# Patient Record
Sex: FEMALE | Race: WHITE | NOT HISPANIC OR LATINO | Employment: FULL TIME | URBAN - METROPOLITAN AREA
[De-identification: names, ages, dates, MRNs, and addresses within clinical notes are randomized per-mention and may not be internally consistent; named-entity substitution may affect disease eponyms.]

---

## 2018-04-13 ENCOUNTER — OFFICE VISIT (OUTPATIENT)
Dept: MEDICAL GROUP | Age: 23
End: 2018-04-13
Payer: COMMERCIAL

## 2018-04-13 VITALS
HEART RATE: 89 BPM | HEIGHT: 67 IN | OXYGEN SATURATION: 98 % | SYSTOLIC BLOOD PRESSURE: 102 MMHG | BODY MASS INDEX: 22.6 KG/M2 | WEIGHT: 144 LBS | DIASTOLIC BLOOD PRESSURE: 58 MMHG | TEMPERATURE: 99 F

## 2018-04-13 DIAGNOSIS — Z00.00 PREVENTATIVE HEALTH CARE: ICD-10-CM

## 2018-04-13 DIAGNOSIS — Z3A.01 LESS THAN 8 WEEKS GESTATION OF PREGNANCY: ICD-10-CM

## 2018-04-13 PROCEDURE — 99385 PREV VISIT NEW AGE 18-39: CPT | Performed by: FAMILY MEDICINE

## 2018-04-13 ASSESSMENT — PATIENT HEALTH QUESTIONNAIRE - PHQ9: CLINICAL INTERPRETATION OF PHQ2 SCORE: 0

## 2018-04-13 NOTE — ASSESSMENT & PLAN NOTE
The patient is a 23-year-old female who presents to clinic to establish care. She has a significant history of being pregnant missing her last menstrual period 4 wks ago.   The patient denied any chest pain, no sob, no deng, no  pnd, no orthopnea, no headache, no changes in vision, no numbness or tingling, no nausea, no diarrhea, no abdominal pain, no fevers, no chills, no bright red blood per rectum, no  difficulty urinating, no burning during micturition, no depressed mood, no other concerns.      realsltate agent x 5 years    Adopted so does not know genes

## 2018-04-13 NOTE — ASSESSMENT & PLAN NOTE
The patient is a 23-year-old  at 40 weeks EGA by last menstrual period. She states that she does not plan on keeping the baby and has ordered plan ''C'' pills which are coming in the mail.  She is not  feeling contractions.  She denies any LOF or VB.  She denies any RUQ pain, change in vision, or unusual HA. She denied any gush of fluid from vagina, no bleeding, no abdominal pain, no headaches, no numbness or tingling anywhere.

## 2018-04-13 NOTE — PROGRESS NOTES
This medical record contains text that has been entered with the assistance of computer voice recognition and dictation software.  Therefore, it may contain unintended errors in text, spelling, punctuation, or grammar    Chief Complaint   Patient presents with   • [facility] Care       Sunitha Henry is a 23 y.o. female here evaluation and management of: [facility], pregnancy      HPI:     Preventative health care  The patient is a 23-year-old female who presents to clinic to [facility]. She has a significant history of being pregnant missing her last menstrual period 4 wks ago.   The patient denied any chest pain, no sob, no deng, no  pnd, no orthopnea, no headache, no changes in vision, no numbness or tingling, no nausea, no diarrhea, no abdominal pain, no fevers, no chills, no bright red blood per rectum, no  difficulty urinating, no burning during micturition, no depressed mood, no other concerns.      realsltate agent x 5 years    Adopted so does not know genes      Less than 8 weeks gestation of pregnancy  The patient is a 23-year-old  at 40 weeks EGA by last menstrual period. She states that she does not plan on keeping the baby and has ordered plan ''C'' pills which are coming in the mail.  She is not  feeling contractions.  She denies any LOF or VB.  She denies any RUQ pain, change in vision, or unusual HA. She denied any gush of fluid from vagina, no bleeding, no abdominal pain, no headaches, no numbness or tingling anywhere.                         Current medicines (including changes today)  No current outpatient prescriptions on file.     No current facility-administered medications for this visit.      She  has no past medical history on file.  She  has no past surgical history on file.  Social History   Substance Use Topics   • Smoking status: Never Smoker   • Smokeless tobacco: Never Used   • Alcohol use Yes      Comment: occasinally     Social History     Social History Narrative   • No  "narrative on file     No family history on file.  No family status information on file.         ROS    Please see hpi     All other systems reviewed and are negative     Objective:     Blood pressure 102/58, pulse 89, temperature 37.2 °C (99 °F), height 1.702 m (5' 7\"), weight 65.3 kg (144 lb), last menstrual period 02/26/2018, SpO2 98 %, not currently breastfeeding. Body mass index is 22.55 kg/m².  Physical Exam:    Constitutional: Alert, no distress.  Skin: Warm, dry, good turgor, no rashes in visible areas.  Eye: Equal, round and reactive, conjunctiva clear, lids normal.  ENMT: Lips without lesions, good dentition, oropharynx clear.  Neck: Trachea midline, no masses, no thyromegaly. No cervical or supraclavicular lymphadenopathy.  Respiratory: Unlabored respiratory effort, lungs clear to auscultation, no wheezes, no ronchi.  Cardiovascular: Normal S1, S2, no murmur, no edema.  Abdomen: Soft, non-tender, no masses, no hepatosplenomegaly.  Psych: Alert and oriented x3, normal affect and mood.          Assessment and Plan:   The following treatment plan was discussed      1. Preventative health care  Care has been established  We need baseline labs to establish a clinical profile  We reviewed USPSTF guidelines    Requested Medical records to be sent to us  Denies intimate partner viloence      2. Less than 8 weeks gestation of pregnancy  She can discuss in further detail with OB/GYN    Safe foods in pregnancy were discussed.  She was counseled on avoiding sushi, large fish because of the higher mercury content, as well as unpasteurized foods. Tuna is safe to eat if only 1 times per wk. All Deli meats should be microwaved first.   Also discussed avoiding food borne illnesses by proper storage of food.         - REFERRAL TO GYNECOLOGY  - PREGENPLUS  - COMP METABOLIC PANEL; Future  - LIPID PROFILE; Future  - CBC WITH DIFFERENTIAL; Future        HEALTH MAINTENANCE:    Instructed to Follow up in clinic or ER for " worsening symptoms, difficulty breathing, lack of expected recovery, or should new symptoms or problems arise.    Followup: Return in about 2 months (around 6/13/2018) for Reevaluation.       Once again this medical record contains text that has been entered with the assistance of computer voice recognition and dictation software.  Therefore, it may contain unintended errors in text, spelling, punctuation, or grammar

## 2018-05-07 ENCOUNTER — HOSPITAL ENCOUNTER (OUTPATIENT)
Dept: LAB | Facility: MEDICAL CENTER | Age: 23
End: 2018-05-07
Attending: OBSTETRICS & GYNECOLOGY
Payer: COMMERCIAL

## 2018-05-07 LAB
ABO GROUP BLD: NORMAL
APPEARANCE UR: ABNORMAL
BACTERIA #/AREA URNS HPF: ABNORMAL /HPF
BASOPHILS # BLD AUTO: 0.3 % (ref 0–1.8)
BASOPHILS # BLD: 0.03 K/UL (ref 0–0.12)
BILIRUB UR QL STRIP.AUTO: NEGATIVE
BLD GP AB SCN SERPL QL: NORMAL
CAOX CRY #/AREA URNS HPF: ABNORMAL /HPF
COLOR UR: YELLOW
EOSINOPHIL # BLD AUTO: 0.06 K/UL (ref 0–0.51)
EOSINOPHIL NFR BLD: 0.5 % (ref 0–6.9)
EPI CELLS #/AREA URNS HPF: ABNORMAL /HPF
ERYTHROCYTE [DISTWIDTH] IN BLOOD BY AUTOMATED COUNT: 39.5 FL (ref 35.9–50)
GLUCOSE UR STRIP.AUTO-MCNC: NEGATIVE MG/DL
HBV SURFACE AG SER QL: NEGATIVE
HCT VFR BLD AUTO: 36.8 % (ref 37–47)
HCV AB SER QL: NEGATIVE
HGB BLD-MCNC: 12.8 G/DL (ref 12–16)
HIV 1+2 AB+HIV1 P24 AG SERPL QL IA: NON REACTIVE
HYALINE CASTS #/AREA URNS LPF: ABNORMAL /LPF
IMM GRANULOCYTES # BLD AUTO: 0.03 K/UL (ref 0–0.11)
IMM GRANULOCYTES NFR BLD AUTO: 0.3 % (ref 0–0.9)
KETONES UR STRIP.AUTO-MCNC: NEGATIVE MG/DL
LEUKOCYTE ESTERASE UR QL STRIP.AUTO: ABNORMAL
LYMPHOCYTES # BLD AUTO: 2.09 K/UL (ref 1–4.8)
LYMPHOCYTES NFR BLD: 18.9 % (ref 22–41)
MCH RBC QN AUTO: 31 PG (ref 27–33)
MCHC RBC AUTO-ENTMCNC: 34.8 G/DL (ref 33.6–35)
MCV RBC AUTO: 89.1 FL (ref 81.4–97.8)
MICRO URNS: ABNORMAL
MONOCYTES # BLD AUTO: 0.51 K/UL (ref 0–0.85)
MONOCYTES NFR BLD AUTO: 4.6 % (ref 0–13.4)
NEUTROPHILS # BLD AUTO: 8.34 K/UL (ref 2–7.15)
NEUTROPHILS NFR BLD: 75.4 % (ref 44–72)
NITRITE UR QL STRIP.AUTO: NEGATIVE
NRBC # BLD AUTO: 0 K/UL
NRBC BLD-RTO: 0 /100 WBC
PH UR STRIP.AUTO: 5 [PH]
PLATELET # BLD AUTO: 275 K/UL (ref 164–446)
PMV BLD AUTO: 11.1 FL (ref 9–12.9)
PROT UR QL STRIP: NEGATIVE MG/DL
RBC # BLD AUTO: 4.13 M/UL (ref 4.2–5.4)
RBC # URNS HPF: ABNORMAL /HPF
RBC UR QL AUTO: NEGATIVE
RH BLD: NORMAL
RUBV AB SER QL: 10 IU/ML
SP GR UR STRIP.AUTO: 1.03
TREPONEMA PALLIDUM IGG+IGM AB [PRESENCE] IN SERUM OR PLASMA BY IMMUNOASSAY: NON REACTIVE
UROBILINOGEN UR STRIP.AUTO-MCNC: 0.2 MG/DL
WBC # BLD AUTO: 11.1 K/UL (ref 4.8–10.8)
WBC #/AREA URNS HPF: ABNORMAL /HPF

## 2018-05-07 PROCEDURE — 87389 HIV-1 AG W/HIV-1&-2 AB AG IA: CPT

## 2018-05-07 PROCEDURE — 86901 BLOOD TYPING SEROLOGIC RH(D): CPT

## 2018-05-07 PROCEDURE — 88175 CYTOPATH C/V AUTO FLUID REDO: CPT

## 2018-05-07 PROCEDURE — 86900 BLOOD TYPING SEROLOGIC ABO: CPT

## 2018-05-07 PROCEDURE — 87491 CHLMYD TRACH DNA AMP PROBE: CPT

## 2018-05-07 PROCEDURE — 36415 COLL VENOUS BLD VENIPUNCTURE: CPT

## 2018-05-07 PROCEDURE — 86762 RUBELLA ANTIBODY: CPT

## 2018-05-07 PROCEDURE — 86850 RBC ANTIBODY SCREEN: CPT

## 2018-05-07 PROCEDURE — 87340 HEPATITIS B SURFACE AG IA: CPT

## 2018-05-07 PROCEDURE — 85025 COMPLETE CBC W/AUTO DIFF WBC: CPT

## 2018-05-07 PROCEDURE — 81220 CFTR GENE COM VARIANTS: CPT

## 2018-05-07 PROCEDURE — 86780 TREPONEMA PALLIDUM: CPT

## 2018-05-07 PROCEDURE — 86803 HEPATITIS C AB TEST: CPT

## 2018-05-07 PROCEDURE — 87591 N.GONORRHOEAE DNA AMP PROB: CPT

## 2018-05-07 PROCEDURE — 81001 URINALYSIS AUTO W/SCOPE: CPT

## 2018-05-10 LAB
C TRACH DNA GENITAL QL NAA+PROBE: NEGATIVE
CYTOLOGY REG CYTOL: NORMAL
N GONORRHOEA DNA GENITAL QL NAA+PROBE: NEGATIVE
SPECIMEN SOURCE: NORMAL

## 2018-05-14 LAB
CF EXPANDED VARIANT PANEL INTERP Q4864: NORMAL
CFTR ALLELE 1 BLD/T QL: NEGATIVE
CFTR ALLELE 1 BLD/T QL: NEGATIVE
CFTR MUT ANL BLD/T: NORMAL

## 2018-05-17 LAB — RUBV IGM SER IA-ACNC: NORMAL

## 2018-07-03 ENCOUNTER — HOSPITAL ENCOUNTER (OUTPATIENT)
Dept: LAB | Facility: MEDICAL CENTER | Age: 23
End: 2018-07-03
Attending: OBSTETRICS & GYNECOLOGY
Payer: COMMERCIAL

## 2018-07-03 PROCEDURE — 81511 FTL CGEN ABNOR FOUR ANAL: CPT

## 2018-07-03 PROCEDURE — 36415 COLL VENOUS BLD VENIPUNCTURE: CPT

## 2018-07-06 LAB
# FETUSES US: NORMAL
AFP MOM SERPL: 0.63
AFP SERPL-MCNC: 31 NG/ML
AGE - REPORTED: 22.7 YR
CURRENT SMOKER: NO
FAMILY MEMBER DISEASES HX: NO
GA METHOD: NORMAL
GA: NORMAL WK
HCG MOM SERPL: 1.42
HCG SERPL-ACNC: NORMAL IU/L
HX OF HEREDITARY DISORDERS: NO
IDDM PATIENT QL: NO
INHIBIN A MOM SERPL: 1.13
INHIBIN A SERPL-MCNC: 185 PG/ML
INTEGRATED SCN PATIENT-IMP: NORMAL
PATHOLOGY STUDY: NORMAL
SPECIMEN DRAWN SERPL: NORMAL
U ESTRIOL MOM SERPL: 0.89
U ESTRIOL SERPL-MCNC: 1.61 NG/ML

## 2018-09-10 ENCOUNTER — HOSPITAL ENCOUNTER (OUTPATIENT)
Dept: LAB | Facility: MEDICAL CENTER | Age: 23
End: 2018-09-10
Attending: OBSTETRICS & GYNECOLOGY
Payer: COMMERCIAL

## 2018-09-10 LAB
BASOPHILS # BLD AUTO: 0.4 % (ref 0–1.8)
BASOPHILS # BLD: 0.03 K/UL (ref 0–0.12)
EOSINOPHIL # BLD AUTO: 0.06 K/UL (ref 0–0.51)
EOSINOPHIL NFR BLD: 0.7 % (ref 0–6.9)
ERYTHROCYTE [DISTWIDTH] IN BLOOD BY AUTOMATED COUNT: 42.8 FL (ref 35.9–50)
GLUCOSE 1H P 50 G GLC PO SERPL-MCNC: 134 MG/DL (ref 70–139)
HCT VFR BLD AUTO: 35.8 % (ref 37–47)
HGB BLD-MCNC: 12 G/DL (ref 12–16)
IMM GRANULOCYTES # BLD AUTO: 0.14 K/UL (ref 0–0.11)
IMM GRANULOCYTES NFR BLD AUTO: 1.7 % (ref 0–0.9)
LYMPHOCYTES # BLD AUTO: 1.56 K/UL (ref 1–4.8)
LYMPHOCYTES NFR BLD: 18.6 % (ref 22–41)
MCH RBC QN AUTO: 31.7 PG (ref 27–33)
MCHC RBC AUTO-ENTMCNC: 33.5 G/DL (ref 33.6–35)
MCV RBC AUTO: 94.5 FL (ref 81.4–97.8)
MONOCYTES # BLD AUTO: 0.45 K/UL (ref 0–0.85)
MONOCYTES NFR BLD AUTO: 5.4 % (ref 0–13.4)
NEUTROPHILS # BLD AUTO: 6.13 K/UL (ref 2–7.15)
NEUTROPHILS NFR BLD: 73.2 % (ref 44–72)
NRBC # BLD AUTO: 0 K/UL
NRBC BLD-RTO: 0 /100 WBC
PLATELET # BLD AUTO: 218 K/UL (ref 164–446)
PMV BLD AUTO: 10.6 FL (ref 9–12.9)
RBC # BLD AUTO: 3.79 M/UL (ref 4.2–5.4)
WBC # BLD AUTO: 8.4 K/UL (ref 4.8–10.8)

## 2018-09-10 PROCEDURE — 82950 GLUCOSE TEST: CPT

## 2018-09-10 PROCEDURE — 36415 COLL VENOUS BLD VENIPUNCTURE: CPT

## 2018-09-10 PROCEDURE — 85025 COMPLETE CBC W/AUTO DIFF WBC: CPT

## 2018-10-28 ENCOUNTER — APPOINTMENT (OUTPATIENT)
Dept: OTHER | Facility: IMAGING CENTER | Age: 23
End: 2018-10-28

## 2018-11-07 LAB — STREP GP B DNA PCR: NEGATIVE

## 2018-11-29 ENCOUNTER — HOSPITAL ENCOUNTER (INPATIENT)
Facility: MEDICAL CENTER | Age: 23
LOS: 2 days | End: 2018-12-01
Attending: OBSTETRICS & GYNECOLOGY | Admitting: OBSTETRICS & GYNECOLOGY
Payer: COMMERCIAL

## 2018-11-29 DIAGNOSIS — R10.2 POSTPARTUM PERINEAL PAIN: Primary | ICD-10-CM

## 2018-11-29 LAB
BASOPHILS # BLD AUTO: 0.2 % (ref 0–1.8)
BASOPHILS # BLD: 0.03 K/UL (ref 0–0.12)
EOSINOPHIL # BLD AUTO: 0.03 K/UL (ref 0–0.51)
EOSINOPHIL NFR BLD: 0.2 % (ref 0–6.9)
ERYTHROCYTE [DISTWIDTH] IN BLOOD BY AUTOMATED COUNT: 43.5 FL (ref 35.9–50)
HCT VFR BLD AUTO: 38.8 % (ref 37–47)
HGB BLD-MCNC: 12.7 G/DL (ref 12–16)
HOLDING TUBE BB 8507: NORMAL
IMM GRANULOCYTES # BLD AUTO: 0.09 K/UL (ref 0–0.11)
IMM GRANULOCYTES NFR BLD AUTO: 0.7 % (ref 0–0.9)
LYMPHOCYTES # BLD AUTO: 2.15 K/UL (ref 1–4.8)
LYMPHOCYTES NFR BLD: 16.6 % (ref 22–41)
MCH RBC QN AUTO: 29.4 PG (ref 27–33)
MCHC RBC AUTO-ENTMCNC: 32.7 G/DL (ref 33.6–35)
MCV RBC AUTO: 89.8 FL (ref 81.4–97.8)
MONOCYTES # BLD AUTO: 0.71 K/UL (ref 0–0.85)
MONOCYTES NFR BLD AUTO: 5.5 % (ref 0–13.4)
NEUTROPHILS # BLD AUTO: 9.92 K/UL (ref 2–7.15)
NEUTROPHILS NFR BLD: 76.8 % (ref 44–72)
NRBC # BLD AUTO: 0 K/UL
NRBC BLD-RTO: 0 /100 WBC
PLATELET # BLD AUTO: 206 K/UL (ref 164–446)
PMV BLD AUTO: 11.8 FL (ref 9–12.9)
RBC # BLD AUTO: 4.32 M/UL (ref 4.2–5.4)
WBC # BLD AUTO: 12.9 K/UL (ref 4.8–10.8)

## 2018-11-29 PROCEDURE — 304965 HCHG RECOVERY SERVICES

## 2018-11-29 PROCEDURE — 770002 HCHG ROOM/CARE - OB PRIVATE (112)

## 2018-11-29 PROCEDURE — 700102 HCHG RX REV CODE 250 W/ 637 OVERRIDE(OP)

## 2018-11-29 PROCEDURE — A9270 NON-COVERED ITEM OR SERVICE: HCPCS | Performed by: OBSTETRICS & GYNECOLOGY

## 2018-11-29 PROCEDURE — 59409 OBSTETRICAL CARE: CPT

## 2018-11-29 PROCEDURE — 700105 HCHG RX REV CODE 258: Performed by: OBSTETRICS & GYNECOLOGY

## 2018-11-29 PROCEDURE — 0KQM0ZZ REPAIR PERINEUM MUSCLE, OPEN APPROACH: ICD-10-PCS | Performed by: OBSTETRICS & GYNECOLOGY

## 2018-11-29 PROCEDURE — 85025 COMPLETE CBC W/AUTO DIFF WBC: CPT

## 2018-11-29 PROCEDURE — 700111 HCHG RX REV CODE 636 W/ 250 OVERRIDE (IP): Performed by: OBSTETRICS & GYNECOLOGY

## 2018-11-29 PROCEDURE — 700101 HCHG RX REV CODE 250

## 2018-11-29 PROCEDURE — A9270 NON-COVERED ITEM OR SERVICE: HCPCS

## 2018-11-29 PROCEDURE — 36415 COLL VENOUS BLD VENIPUNCTURE: CPT

## 2018-11-29 PROCEDURE — 700102 HCHG RX REV CODE 250 W/ 637 OVERRIDE(OP): Performed by: OBSTETRICS & GYNECOLOGY

## 2018-11-29 RX ORDER — ONDANSETRON 2 MG/ML
4 INJECTION INTRAMUSCULAR; INTRAVENOUS EVERY 6 HOURS PRN
Status: DISCONTINUED | OUTPATIENT
Start: 2018-11-29 | End: 2018-12-01 | Stop reason: HOSPADM

## 2018-11-29 RX ORDER — LIDOCAINE HYDROCHLORIDE 10 MG/ML
INJECTION, SOLUTION INFILTRATION; PERINEURAL
Status: COMPLETED
Start: 2018-11-29 | End: 2018-11-29

## 2018-11-29 RX ORDER — MISOPROSTOL 200 UG/1
TABLET ORAL
Status: COMPLETED
Start: 2018-11-29 | End: 2018-11-29

## 2018-11-29 RX ORDER — METHYLERGONOVINE MALEATE 0.2 MG/ML
0.2 INJECTION INTRAVENOUS
Status: DISCONTINUED | OUTPATIENT
Start: 2018-11-29 | End: 2018-11-29 | Stop reason: HOSPADM

## 2018-11-29 RX ORDER — OXYCODONE HYDROCHLORIDE AND ACETAMINOPHEN 5; 325 MG/1; MG/1
1 TABLET ORAL EVERY 4 HOURS PRN
Status: DISCONTINUED | OUTPATIENT
Start: 2018-11-29 | End: 2018-12-01 | Stop reason: HOSPADM

## 2018-11-29 RX ORDER — MISOPROSTOL 200 UG/1
1000 TABLET ORAL
Status: COMPLETED | OUTPATIENT
Start: 2018-11-29 | End: 2018-11-29

## 2018-11-29 RX ORDER — ONDANSETRON 4 MG/1
4 TABLET, ORALLY DISINTEGRATING ORAL EVERY 6 HOURS PRN
Status: DISCONTINUED | OUTPATIENT
Start: 2018-11-29 | End: 2018-12-01 | Stop reason: HOSPADM

## 2018-11-29 RX ORDER — SODIUM CHLORIDE, SODIUM LACTATE, POTASSIUM CHLORIDE, CALCIUM CHLORIDE 600; 310; 30; 20 MG/100ML; MG/100ML; MG/100ML; MG/100ML
INJECTION, SOLUTION INTRAVENOUS CONTINUOUS
Status: DISPENSED | OUTPATIENT
Start: 2018-11-29 | End: 2018-11-29

## 2018-11-29 RX ORDER — CARBOPROST TROMETHAMINE 250 UG/ML
250 INJECTION, SOLUTION INTRAMUSCULAR
Status: DISCONTINUED | OUTPATIENT
Start: 2018-11-29 | End: 2018-11-29 | Stop reason: HOSPADM

## 2018-11-29 RX ORDER — ROPIVACAINE HYDROCHLORIDE 2 MG/ML
INJECTION, SOLUTION EPIDURAL; INFILTRATION; PERINEURAL
Status: COMPLETED
Start: 2018-11-29 | End: 2018-11-29

## 2018-11-29 RX ORDER — BUPIVACAINE HYDROCHLORIDE 2.5 MG/ML
INJECTION, SOLUTION EPIDURAL; INFILTRATION; INTRACAUDAL
Status: COMPLETED
Start: 2018-11-29 | End: 2018-11-29

## 2018-11-29 RX ORDER — IBUPROFEN 600 MG/1
600 TABLET ORAL EVERY 6 HOURS PRN
Status: DISCONTINUED | OUTPATIENT
Start: 2018-11-29 | End: 2018-12-01 | Stop reason: HOSPADM

## 2018-11-29 RX ADMIN — Medication 2 MILLI-UNITS/MIN: at 16:12

## 2018-11-29 RX ADMIN — LIDOCAINE HYDROCHLORIDE: 10 INJECTION, SOLUTION INFILTRATION; PERINEURAL at 21:34

## 2018-11-29 RX ADMIN — Medication 125 ML/HR: at 22:33

## 2018-11-29 RX ADMIN — SODIUM CHLORIDE, POTASSIUM CHLORIDE, SODIUM LACTATE AND CALCIUM CHLORIDE: 600; 310; 30; 20 INJECTION, SOLUTION INTRAVENOUS at 13:00

## 2018-11-29 RX ADMIN — MISOPROSTOL 800 MCG: 200 TABLET ORAL at 21:35

## 2018-11-29 RX ADMIN — Medication 2000 ML/HR: at 21:00

## 2018-11-29 RX ADMIN — OXYCODONE AND ACETAMINOPHEN 1 TABLET: 5; 325 TABLET ORAL at 22:32

## 2018-11-29 ASSESSMENT — PAIN SCALES - GENERAL
PAINLEVEL_OUTOF10: 2
PAINLEVEL_OUTOF10: 5

## 2018-11-29 ASSESSMENT — LIFESTYLE VARIABLES: EVER_SMOKED: NEVER

## 2018-11-29 NOTE — PROGRESS NOTES
, EDC: , 40.2 weeks  Pt presents to L&D with c/o SROM at 0940 this morning. Pt unsure if she is brian. +FM. EFM/Ocean placed. VSS. Sterile speculum placed. Moderate, clear pooling present. SVE, 3-80/-2.  1210: Dr. Quinteros paged. Orders received for admission.  1315: Dr. Quinteros at bedside.  1550: SVE by RN, 80/-2. Pt coping well. Pt educated on position changes, emptying her bladder often, and nipple stimulation to promote contractions.  1715: SVE by RN, 5-80/-2.   1740: Dr. Quinteros at bedside.  1900: Report given to BON Booth RN. POC discussed.

## 2018-11-29 NOTE — PROGRESS NOTES
"Labor Progress Note    Sunitha Henry   40w2d      Subjective:  Pt with SROM, clear fluid this am at 9: 40 am. Pt is feeling contractions but not painful  Uterine contractions:yes  Pain: Yes. Severity: mild    Objective:   Vitals:    11/29/18 1133 11/29/18 1152   BP:  121/78   Pulse:  93   Temp:  36.8 °C (98.2 °F)   TempSrc:  Temporal   Weight: 83 kg (183 lb)    Height: 1.702 m (5' 7\")      Fetal heart variability:140's category 1 no decels  [place]: q 2-3  SVE: 3-4/80/-2 per nurse, gross srom      Membranes ruptured: .yes, 9:40 am    Meds:   Epidural : .no  Pitocin: .no    Labs:  No results found for this or any previous visit (from the past 24 hour(s)).    Assessment:   40w2d/AROM- pt has been admitted. Pt wants to wait on pitocin but will accept it if there is no cervical change in 2 hrs.   GBBS: negative  Fetal status: reassuring        Courtney Quinteros          "

## 2018-11-30 LAB
ERYTHROCYTE [DISTWIDTH] IN BLOOD BY AUTOMATED COUNT: 42.7 FL (ref 35.9–50)
HCT VFR BLD AUTO: 29 % (ref 37–47)
HGB BLD-MCNC: 9.7 G/DL (ref 12–16)
MCH RBC QN AUTO: 29.9 PG (ref 27–33)
MCHC RBC AUTO-ENTMCNC: 33.4 G/DL (ref 33.6–35)
MCV RBC AUTO: 89.5 FL (ref 81.4–97.8)
PLATELET # BLD AUTO: 185 K/UL (ref 164–446)
PMV BLD AUTO: 11.5 FL (ref 9–12.9)
RBC # BLD AUTO: 3.24 M/UL (ref 4.2–5.4)
WBC # BLD AUTO: 17.5 K/UL (ref 4.8–10.8)

## 2018-11-30 PROCEDURE — A9270 NON-COVERED ITEM OR SERVICE: HCPCS | Performed by: OBSTETRICS & GYNECOLOGY

## 2018-11-30 PROCEDURE — 59409 OBSTETRICAL CARE: CPT

## 2018-11-30 PROCEDURE — 700102 HCHG RX REV CODE 250 W/ 637 OVERRIDE(OP): Performed by: OBSTETRICS & GYNECOLOGY

## 2018-11-30 PROCEDURE — 700112 HCHG RX REV CODE 229: Performed by: OBSTETRICS & GYNECOLOGY

## 2018-11-30 PROCEDURE — 36415 COLL VENOUS BLD VENIPUNCTURE: CPT

## 2018-11-30 PROCEDURE — 304965 HCHG RECOVERY SERVICES

## 2018-11-30 PROCEDURE — 85027 COMPLETE CBC AUTOMATED: CPT

## 2018-11-30 PROCEDURE — 770002 HCHG ROOM/CARE - OB PRIVATE (112)

## 2018-11-30 RX ORDER — VITAMIN A ACETATE, BETA CAROTENE, ASCORBIC ACID, CHOLECALCIFEROL, .ALPHA.-TOCOPHEROL ACETATE, DL-, THIAMINE MONONITRATE, RIBOFLAVIN, NIACINAMIDE, PYRIDOXINE HYDROCHLORIDE, FOLIC ACID, CYANOCOBALAMIN, CALCIUM CARBONATE, FERROUS FUMARATE, ZINC OXIDE, CUPRIC OXIDE 3080; 12; 120; 400; 1; 1.84; 3; 20; 22; 920; 25; 200; 27; 10; 2 [IU]/1; UG/1; MG/1; [IU]/1; MG/1; MG/1; MG/1; MG/1; MG/1; [IU]/1; MG/1; MG/1; MG/1; MG/1; MG/1
1 TABLET, FILM COATED ORAL EVERY MORNING
Status: DISCONTINUED | OUTPATIENT
Start: 2018-11-30 | End: 2018-12-01 | Stop reason: HOSPADM

## 2018-11-30 RX ORDER — CARBOPROST TROMETHAMINE 250 UG/ML
250 INJECTION, SOLUTION INTRAMUSCULAR
Status: DISCONTINUED | OUTPATIENT
Start: 2018-11-30 | End: 2018-12-01 | Stop reason: HOSPADM

## 2018-11-30 RX ORDER — METHYLERGONOVINE MALEATE 0.2 MG/ML
0.2 INJECTION INTRAVENOUS
Status: DISCONTINUED | OUTPATIENT
Start: 2018-11-30 | End: 2018-12-01 | Stop reason: HOSPADM

## 2018-11-30 RX ORDER — MISOPROSTOL 200 UG/1
600 TABLET ORAL
Status: DISCONTINUED | OUTPATIENT
Start: 2018-11-30 | End: 2018-11-30

## 2018-11-30 RX ORDER — OXYCODONE AND ACETAMINOPHEN 10; 325 MG/1; MG/1
1 TABLET ORAL EVERY 4 HOURS PRN
Status: DISCONTINUED | OUTPATIENT
Start: 2018-11-30 | End: 2018-12-01 | Stop reason: HOSPADM

## 2018-11-30 RX ORDER — SODIUM CHLORIDE, SODIUM LACTATE, POTASSIUM CHLORIDE, CALCIUM CHLORIDE 600; 310; 30; 20 MG/100ML; MG/100ML; MG/100ML; MG/100ML
INJECTION, SOLUTION INTRAVENOUS PRN
Status: DISCONTINUED | OUTPATIENT
Start: 2018-11-30 | End: 2018-12-01 | Stop reason: HOSPADM

## 2018-11-30 RX ORDER — DOCUSATE SODIUM 100 MG/1
100 CAPSULE, LIQUID FILLED ORAL 2 TIMES DAILY PRN
Status: DISCONTINUED | OUTPATIENT
Start: 2018-11-30 | End: 2018-12-01 | Stop reason: HOSPADM

## 2018-11-30 RX ADMIN — Medication 1 TABLET: at 06:50

## 2018-11-30 RX ADMIN — IBUPROFEN 600 MG: 600 TABLET, FILM COATED ORAL at 00:37

## 2018-11-30 RX ADMIN — IBUPROFEN 600 MG: 600 TABLET, FILM COATED ORAL at 19:28

## 2018-11-30 RX ADMIN — OXYCODONE AND ACETAMINOPHEN 1 TABLET: 5; 325 TABLET ORAL at 14:50

## 2018-11-30 RX ADMIN — OXYCODONE AND ACETAMINOPHEN 1 TABLET: 5; 325 TABLET ORAL at 19:29

## 2018-11-30 RX ADMIN — DOCUSATE SODIUM 100 MG: 100 CAPSULE, LIQUID FILLED ORAL at 13:40

## 2018-11-30 RX ADMIN — IBUPROFEN 600 MG: 600 TABLET, FILM COATED ORAL at 10:39

## 2018-11-30 RX ADMIN — IBUPROFEN 600 MG: 600 TABLET, FILM COATED ORAL at 04:57

## 2018-11-30 RX ADMIN — OXYCODONE AND ACETAMINOPHEN 1 TABLET: 5; 325 TABLET ORAL at 02:35

## 2018-11-30 RX ADMIN — OXYCODONE AND ACETAMINOPHEN 1 TABLET: 5; 325 TABLET ORAL at 10:39

## 2018-11-30 RX ADMIN — OXYCODONE AND ACETAMINOPHEN 1 TABLET: 5; 325 TABLET ORAL at 06:49

## 2018-11-30 ASSESSMENT — PAIN SCALES - GENERAL
PAINLEVEL_OUTOF10: 2
PAINLEVEL_OUTOF10: 7
PAINLEVEL_OUTOF10: 9
PAINLEVEL_OUTOF10: 6
PAINLEVEL_OUTOF10: 6
PAINLEVEL_OUTOF10: 3
PAINLEVEL_OUTOF10: 7
PAINLEVEL_OUTOF10: 7

## 2018-11-30 NOTE — H&P
"DATE OF ADMISSION:  2018    CHIEF COMPLAINT:  \"My water broke at 9:40 in the morning.\"    HISTORY OF PRESENT ILLNESS:  This patient is 23-year-old  1, para 0   with a due date of 2018 based on a 10-week ultrasound who has had   prenatal care with me.  Her prenatal care has been uncomplicated.  She comes   in complaining of spontaneous rupture of membranes at 9:40 in the morning,   which was confirmed.  When she arrived, she was 3-4 cm dilated.  She has   progressed on 2 milliunits of Pitocin and now she is 5-6 cm dilated with a   reassuring fetal heart tracing.  Patient declines an epidural at this time and   she is tolerating the contractions well.    PAST MEDICAL HISTORY:  None.    PAST SURGICAL HISTORY:  None.    MEDICATIONS:  Prenatal vitamins.    ALLERGIES:  No known drug allergies.    OBSTETRICAL HISTORY:  She is a  1, para 0.    GYNECOLOGIC HISTORY:  Patient started menstruating at age 15, has menstrual   cycles every 28 days, lasts about 5 days.  No history of STDs.  No history of   HSV.  Her last Pap smear was on 2018.  Negative Pap, negative chlamydia,   negative gonorrhea.    SOCIAL HISTORY:  Patient is .  Denies tobacco, alcohol or drug use.    PHYSICAL EXAMINATION:  VITAL SIGNS:  Blood pressure is 118/70, afebrile.  Total weight gain this   pregnancy is 43 pounds.  GENERAL:  Pleasant female in some distress secondary to contractions.  LUNGS:  Clear to auscultation bilaterally.  CARDIOVASCULAR:  Regular rate and rhythm, no murmur.  ABDOMEN:  Soft, gravid.  Leopold's to 7-1/2 pounds.  EXTREMITIES:  No calf tenderness.  PELVIC:  Fetal heart tones 130s, category 1.  [place] brian every 2-4   minutes.  Sterile vaginal exam, she is 5-6 cm dilated, 90% effaced, 0 station.    Per nurse with gross rupture of membranes, which was clear.  EXTREMITIES:  No calf tenderness.    LABORATORY DATA:  Patient's prenatal labs:  GBS is negative.  Patient's H and   H at 28 weeks 12.0 " and 35.8 with platelets of 218, patient's 1-hour glucose is   134.  Quadruple screen was negative, chlamydia and gonorrhea negative, Pap   smear negative, cystic fibrosis negative, hepatitis C negative, hepatitis B   surface antigen negative, rubella immune, RPR nonreactive, HIV negative, A   positive, antibody screen negative.    ASSESSMENT AND PLAN:  A 23-year-old  1, para 0 at 40 weeks and 2 days   by ultrasound at 10 weeks.  1.  Active labor/spontaneous rupture of membranes.  Patient at this time is on   2 milliunits of Pitocin and progressing.  Fetal status remains reassuring.    We will continue present management and expect a normal spontaneous vaginal   delivery.  2.  GBS is negative.  3.  Pain control.  Patient declines an epidural and is handling her   contractions without any problems.  4.  Fetal status reassuring.       ____________________________________     MD TINA JUNE / CHARU    DD:  2018 19:47:44  DT:  2018 20:28:19    D#:  [id number]  Job#:  [id number]

## 2018-11-30 NOTE — L&D DELIVERY NOTE
DATE OF SERVICE:  2018    Stage I labor:  The patient was admitted as a 23-year-old  1, para 0 at   40+2 weeks gestation with a stated BERENICE of 2018, who presents to labor   and delivery with the report of spontaneous rupture of membranes of clear   fluid at 9:40 a.m. on 2018.  At the time of admission earlier in the   late morning of 2018, the patient was found to be 3-4 cm, 80% effaced,   -2 station.  The patient initially declined IV Pitocin for labor augmentation.    She did then agree to it and was started on this at 1500 hours.  At   approximately 1758 hours, her cervix was reexamined and she was found to be   5-6 cm, 90% effacement, and 0 station.  The patient progressed to completely   dilated, 100% effaced, +2 station at 20:38.  Category 1-2 tracing during stage   I labor.  A prolonged deceleration was noted at approximately 20:35 p.m.    This was noted prior to her progressing to completely dilated.    The tracing was otherwise category 1 tracing throughout stage I labor.    Stage II labor:  Twenty minutes.  I was present for normal spontaneous vaginal   delivery of a vigorous and viable female infant at 2058 hours.  The vertex of   the infant was delivered without difficulty.  No nuchal cord was palpated and   no shoulder dystocia was encountered.  The remainder of the infant delivered   without difficulty.  The umbilical cord was noted to be loosely wrapped around   the infant's left leg.  This was then reduced.  The infant was placed on the   mother's abdomen.  The infant's mouth and nose were bulb suctioned.  Delayed   cord clamping was performed.  A segment of cord was clamped and cut for cord   gas, but this was held as the Apgars scores were 8 at 1 minute and 9 at 5   minutes.    The infant weight is pending.    The patient received IV Pitocin after delivery of the infant.    Stage III labor:  Fifteen minutes.  The placenta delivered intact with a   3-vessel cord at 2113  hours.    The patient's perineum was examined and she was found to have sustained   bilateral second-degree hymenal ring lacerations with bilateral second-degree   labial minora extensions.  These lacerations were repaired in the usual   fashion with 3-0 Vicryl after the instillation of 1% lidocaine without   epinephrine.    Bimanual uterine massage and exploration was performed and small clots were   removed from the lower uterine segment.    The patient received 800 mcg of Cytotec per rectum x1 after the above   examination.    Total  mL.    Patient is doing well after delivery of the infant and is working on   breastfeeding.       ____________________________________     JOSE HENDRICKS MD    HTA / NTS    DD:  11/29/2018 21:46:45  DT:  11/29/2018 22:14:17    D#:  [id number]  Job#:  [id number]    cc: RYNE CHANCE MD

## 2018-11-30 NOTE — LACTATION NOTE
"This note was copied from a baby's chart.  Mother states baby BF for 8-10 minutes just prior to LC arrival, baby currently sleepy and no feeding cues noted, baby was 40.2 weeks gestation at birth, birth weight 3.45 kg, currently approximately 15 hours old, discussed the normal course of BF including feeding frequency and duration, discussed normal  behaviors including sleep-wake cycle, encouraged Q 2-3 hour feeding attempts (more often if feeding cues noted), encouraged frequent [id number].    \"A New Beginning\" booklet provided    Discussed assistance available at [facility] after discharge, invited to BF [place] and encouraged to call to schedule 1:1 consult as needed    Encouraged to call for latch check/assistance as needed  "

## 2018-11-30 NOTE — PROGRESS NOTES
"IUP at 40w2d.  Active labor.  GBS negative.    S: Doing ok.  Feeling increased pressure and having the urge to push.  Asked to evaluate the patient for abnormal fetal heart tones remote from delivery.  Patient to begin maternal expulsive efforts.    O:  Blood pressure 121/64, pulse (!) 110, temperature 36.2 °C (97.2 °F), temperature source Oral, height 1.702 m (5' 7\"), weight 83 kg (183 lb), last menstrual period 2018, not currently breastfeeding.    A, A, and O x 3     SVE: complete/100%/0    TOCO: every 2-4 minutes    EFM: category II tracing.  Baseline 120 bpm with accelerations to 140 bpm.  Prolonged deceleration to 70-80 bpm for 5 minutes with return to baseline and accelerations to 170 bpm.     A/P: IUP at 40w2d.    1. Begin maternal expulsive efforts.  2. GBS negative.  3.  Anticipate .  "

## 2018-11-30 NOTE — CARE PLAN
Problem: Pain  Goal: Alleviation of Pain or a reduction in pain to the patient's comfort goal  Outcome: PROGRESSING AS EXPECTED  Pain POC discussed. Pt desires all natural childbirth and will call out if medication intervention is necessary    Problem: Risk for Infection, Impaired Wound Healing  Goal: Remain free from signs and symptoms of infection  Outcome: PROGRESSING AS EXPECTED  Pt afebrile, no s/s of infection

## 2018-11-30 NOTE — PROGRESS NOTES
-Report received from BON Daniel RN. Pt resting in bed, FOB at bedside. POC discussed. Pt denies needs.   -SVE 7/100/-1  -Dr. Wu phoned, updated on SVE, no new orders received  -Pt called out, requested epidural, IV fluid bolus started, anesthesiologist updated  -Pt called out, reported pressure and needs to push, SVE C/0.   -FHTs down, pt repositioned, oxygen on via mask, IV fluid bolus, pitocin off. FHTs back to baseline within 5 minutes. Phoned Dr. Wu, updated on labor status and requested presence, stated she is on her way  - of viable female infant, Dr. Wu at bedside, apgars 8/9  2330-Pt up to bathroom, voided, pericare provided, gown changed  -Pt transferred to  via wheelchair. Report given to Preeti SUBRAMANIAN.

## 2018-11-30 NOTE — PROGRESS NOTES
"Labor Progress Note    Sunitha Henry   40w2d      Subjective:  Pt now hurting with contractions.  Uterine contractions:yes  Pain: Yes. Severity: moderate    Objective:   Vitals:    18 1133 18 1152 18 1612 18 1617   BP:  121/78  136/63   Pulse:  93  93   Temp:  36.8 °C (98.2 °F) 36.5 °C (97.7 °F)    TempSrc:  Temporal Oral    Weight: 83 kg (183 lb)      Height: 1.702 m (5' 7\")        Fetal heart variability: 130's category 1 no decels  [place]; q 2-4  SVE: 5-6/90/0 per nurse       Membranes ruptured: .yes    Meds:   Epidural : .no  Pitocin: .yes, 2 mu    Labs:  Recent Results (from the past 24 hour(s))   Hold Blood Bank Specimen (Not Tested)    Collection Time: 18  1:00 PM   Result Value Ref Range    Holding Tube - Bb DONE    CBC WITH DIFFERENTIAL    Collection Time: 18  1:00 PM   Result Value Ref Range    WBC 12.9 (H) 4.8 - 10.8 K/uL    RBC 4.32 4.20 - 5.40 M/uL    Hemoglobin 12.7 12.0 - 16.0 g/dL    Hematocrit 38.8 37.0 - 47.0 %    MCV 89.8 81.4 - 97.8 fL    MCH 29.4 27.0 - 33.0 pg    MCHC 32.7 (L) 33.6 - 35.0 g/dL    RDW 43.5 35.9 - 50.0 fL    Platelet Count 206 164 - 446 K/uL    MPV 11.8 9.0 - 12.9 fL    Neutrophils-Polys 76.80 (H) 44.00 - 72.00 %    Lymphocytes 16.60 (L) 22.00 - 41.00 %    Monocytes 5.50 0.00 - 13.40 %    Eosinophils 0.20 0.00 - 6.90 %    Basophils 0.20 0.00 - 1.80 %    Immature Granulocytes 0.70 0.00 - 0.90 %    Nucleated RBC 0.00 /100 WBC    Neutrophils (Absolute) 9.92 (H) 2.00 - 7.15 K/uL    Lymphs (Absolute) 2.15 1.00 - 4.80 K/uL    Monos (Absolute) 0.71 0.00 - 0.85 K/uL    Eos (Absolute) 0.03 0.00 - 0.51 K/uL    Baso (Absolute) 0.03 0.00 - 0.12 K/uL    Immature Granulocytes (abs) 0.09 0.00 - 0.11 K/uL    NRBC (Absolute) 0.00 K/uL       Assessment:   40w2d/active labor/srom- pt progressing on 2 mu of pitocin. CPM, expt   GBBS: negative  Fetal status: reassuring.        Courtney Quinteros          "

## 2018-11-30 NOTE — L&D DELIVERY NOTE
LABOR AND DELIVERY    DELIVERY SUMMARY    STAGE I LABOR:    The patient is a 24 yo  at 40w2d with an BERENICE of 18 who presents to labor and delivery with the report of SROM clear fluid at 0940 on 18.  She was found to be 3-4/80%/-2 at the time of admission.  She was started on IV pitocin for labor augmentation at 1500 and at 1758 her cervix was 5-6/90%/0.  She progressed to complete/100%/+2 at .  Category I-II tracing during stage I labor.  A prolonged deceleration from the baseline of 120 bpm to 70 bpm was noted prior to her progression to completely dilated.      STAGE II LABOR:    I was present for a  of a viable female infant at .  The vertex of the infant delivered without difficulty.  No nuchal cord was palpated and no shoulder dystocia was encountered.   The remainder of the infant delivered without difficulty.  The umbilical cord was noted to be loosely wrapped around the left leg.  The infant was placed on the mother's abdomen.  The infant's mouth and nose were bulb suctioned.  Delayed cord clamping was performed.   The cord was clamped and cut.  A segment of cord was clamped and cut for a cord gas but this was held as the APGAR scores were 8 at one minute and 9 at five minutes.    The infant weight is pending.      The patient received IV pitocin after delivery of the infant.    STAGE III LABOR:    The placenta delivered intact with a 3-vessel cord at .    The patient's perineum was examined and found to have sustained bilateral 2nd degree hymenal ring lacerations with 2nd degree bilateral labia minora extensions.  These lacerations were repaired in the usual fashion with 3-0 vicryl after the instillation of 1% lidocaine without epinephrine.      Bimanual uterine massage and exploration was performed and small clots were removed from the lower uterine segment.     800 mcg of cytotec per rectum x 1 was placed after the above examination.    EBL - 300 cc

## 2018-11-30 NOTE — PROGRESS NOTES
Patient assessment done.  Patient oriented to room and call light.  Condition will continue to be monitored.

## 2018-11-30 NOTE — PROGRESS NOTES
Progress Note    Sunitha Henry PP day 1  Chief Admitting Dx: pregnancy  Indication for care in labor or delivery  Delivery Type: vaginal, spontaneous      Subjective:  Pt working on breastfeeding and wants to stay another day.  Ambulating: yes  Tolerating oral feed: yes  Flatus: yes    Voiding: yes  Dizziness: no  Vitals:    11/29/18 2321 11/29/18 2330 11/30/18 0036 11/30/18 0400   BP: 116/71 116/71 111/79 (!) 96/53   Pulse: (!) 101 (!) 101 100 77   Resp:   16 17   Temp:   37.6 °C (99.6 °F) 36.7 °C (98 °F)   TempSrc:   Temporal Temporal   SpO2:   96% 97%   Weight:       Height:           Exam:  Gen: NAD  Abdomen: Abdomen soft, non-tender. BS normal. No masses,  No organomegaly  Fundus Tenderness: no  Below umbilicus: yes  Extremities: 1+ edema  Lochia: mild    Labs:   Recent Results (from the past 24 hour(s))   Hold Blood Bank Specimen (Not Tested)    Collection Time: 11/29/18  1:00 PM   Result Value Ref Range    Holding Tube - Bb DONE    CBC WITH DIFFERENTIAL    Collection Time: 11/29/18  1:00 PM   Result Value Ref Range    WBC 12.9 (H) 4.8 - 10.8 K/uL    RBC 4.32 4.20 - 5.40 M/uL    Hemoglobin 12.7 12.0 - 16.0 g/dL    Hematocrit 38.8 37.0 - 47.0 %    MCV 89.8 81.4 - 97.8 fL    MCH 29.4 27.0 - 33.0 pg    MCHC 32.7 (L) 33.6 - 35.0 g/dL    RDW 43.5 35.9 - 50.0 fL    Platelet Count 206 164 - 446 K/uL    MPV 11.8 9.0 - 12.9 fL    Neutrophils-Polys 76.80 (H) 44.00 - 72.00 %    Lymphocytes 16.60 (L) 22.00 - 41.00 %    Monocytes 5.50 0.00 - 13.40 %    Eosinophils 0.20 0.00 - 6.90 %    Basophils 0.20 0.00 - 1.80 %    Immature Granulocytes 0.70 0.00 - 0.90 %    Nucleated RBC 0.00 /100 WBC    Neutrophils (Absolute) 9.92 (H) 2.00 - 7.15 K/uL    Lymphs (Absolute) 2.15 1.00 - 4.80 K/uL    Monos (Absolute) 0.71 0.00 - 0.85 K/uL    Eos (Absolute) 0.03 0.00 - 0.51 K/uL    Baso (Absolute) 0.03 0.00 - 0.12 K/uL    Immature Granulocytes (abs) 0.09 0.00 - 0.11 K/uL    NRBC (Absolute) 0.00 K/uL       Assessment:  Continue Routine  postpartum care      Plan:  Consider Discharge 24h-48 hours    Courtney Quinteros M.D.

## 2018-12-01 VITALS
BODY MASS INDEX: 28.72 KG/M2 | HEART RATE: 74 BPM | OXYGEN SATURATION: 95 % | HEIGHT: 67 IN | DIASTOLIC BLOOD PRESSURE: 60 MMHG | SYSTOLIC BLOOD PRESSURE: 92 MMHG | WEIGHT: 183 LBS | RESPIRATION RATE: 18 BRPM | TEMPERATURE: 98.5 F

## 2018-12-01 PROCEDURE — 700102 HCHG RX REV CODE 250 W/ 637 OVERRIDE(OP): Performed by: OBSTETRICS & GYNECOLOGY

## 2018-12-01 PROCEDURE — A9270 NON-COVERED ITEM OR SERVICE: HCPCS | Performed by: OBSTETRICS & GYNECOLOGY

## 2018-12-01 PROCEDURE — 700112 HCHG RX REV CODE 229: Performed by: OBSTETRICS & GYNECOLOGY

## 2018-12-01 RX ORDER — FERROUS SULFATE 325(65) MG
325 TABLET ORAL
Qty: 20 TAB | Refills: 0 | Status: ON HOLD | OUTPATIENT
Start: 2018-12-01 | End: 2021-06-25

## 2018-12-01 RX ORDER — PSEUDOEPHEDRINE HCL 30 MG
100 TABLET ORAL 2 TIMES DAILY PRN
Qty: 60 CAP | Refills: 1 | Status: SHIPPED | OUTPATIENT
Start: 2018-12-01 | End: 2022-07-07

## 2018-12-01 RX ORDER — OXYCODONE AND ACETAMINOPHEN 10; 325 MG/1; MG/1
.5-1 TABLET ORAL EVERY 6 HOURS PRN
Qty: 15 TAB | Refills: 0 | Status: SHIPPED | OUTPATIENT
Start: 2018-12-01 | End: 2018-12-05

## 2018-12-01 RX ORDER — FERROUS SULFATE 325(65) MG
325 TABLET ORAL
Status: DISCONTINUED | OUTPATIENT
Start: 2018-12-01 | End: 2018-12-01 | Stop reason: HOSPADM

## 2018-12-01 RX ORDER — IBUPROFEN 600 MG/1
600 TABLET ORAL EVERY 6 HOURS PRN
Qty: 20 TAB | Refills: 1 | Status: ON HOLD | OUTPATIENT
Start: 2018-12-01 | End: 2021-06-25

## 2018-12-01 RX ADMIN — Medication 1 TABLET: at 08:33

## 2018-12-01 RX ADMIN — FERROUS SULFATE TAB 325 MG (65 MG ELEMENTAL FE) 325 MG: 325 (65 FE) TAB at 08:33

## 2018-12-01 RX ADMIN — IBUPROFEN 600 MG: 600 TABLET, FILM COATED ORAL at 06:25

## 2018-12-01 RX ADMIN — OXYCODONE HYDROCHLORIDE AND ACETAMINOPHEN 1 TABLET: 10; 325 TABLET ORAL at 06:25

## 2018-12-01 RX ADMIN — DOCUSATE SODIUM 100 MG: 100 CAPSULE, LIQUID FILLED ORAL at 08:35

## 2018-12-01 ASSESSMENT — EDINBURGH POSTNATAL DEPRESSION SCALE (EPDS)
I HAVE BEEN SO UNHAPPY THAT I HAVE BEEN CRYING: NO, NEVER
I HAVE FELT SAD OR MISERABLE: NOT VERY OFTEN
THINGS HAVE BEEN GETTING ON TOP OF ME: NO, I HAVE BEEN COPING AS WELL AS EVER
I HAVE LOOKED FORWARD WITH ENJOYMENT TO THINGS: AS MUCH AS I EVER DID
I HAVE BEEN SO UNHAPPY THAT I HAVE HAD DIFFICULTY SLEEPING: NOT AT ALL
I HAVE FELT SCARED OR PANICKY FOR NO GOOD REASON: NO, NOT AT ALL
I HAVE BEEN ABLE TO LAUGH AND SEE THE FUNNY SIDE OF THINGS: AS MUCH AS I ALWAYS COULD
I HAVE BLAMED MYSELF UNNECESSARILY WHEN THINGS WENT WRONG: NOT VERY OFTEN
I HAVE BEEN ANXIOUS OR WORRIED FOR NO GOOD REASON: HARDLY EVER
THE THOUGHT OF HARMING MYSELF HAS OCCURRED TO ME: NEVER

## 2018-12-01 ASSESSMENT — PAIN SCALES - GENERAL
PAINLEVEL_OUTOF10: 4
PAINLEVEL_OUTOF10: 7

## 2018-12-01 NOTE — PROGRESS NOTES
Pt discharged home with infant via WC and hospital escort. Bands were checked with infant. Pt stated that all her questions had been answered.

## 2018-12-01 NOTE — PROGRESS NOTES
1900- Bedside report received, assumed care of patient.  Pt sitting up in bed with no s/s of distress, family at bedside, denies needs at this time.  1925- Pt medicated for pain.  1940- Pt breastfeeding infant independently.  2100- Pt assessment complete, wnl.  Pt ambulating and voiding without difficulty.  Bonding well with infant.  Plan of care discussed with patient for the night.  Questions answered.  FOB at bedside and supportive.  Encouraged to call with needs.

## 2018-12-01 NOTE — DISCHARGE INSTRUCTIONS
POSTPARTUM DISCHARGE INSTRUCTIONS FOR MOM    YOB: 1995   Age: 23 y.o.               Admit Date: 2018     Discharge Date: 2018  Attending Doctor:  Courtney Quinteros M.D.                  Allergies:  Patient has no known allergies.    Discharged to home by car. Discharged via wheelchair, hospital escort: Yes.  Special equipment needed: Not Applicable  Belongings with: Personal  Be sure to schedule a follow-up appointment with your primary care doctor or any specialists as instructed.     Discharge Plan:   Diet Plan: Discussed  Activity Level: Discussed  Confirmed Follow up Appointment: Patient to Call and Schedule Appointment  Confirmed Symptoms Management: Discussed  Medication Reconciliation Updated: Yes  Influenza Vaccine Indication: Patient Refuses    REASONS TO CALL YOUR OBSTETRICIAN:  1.   Persistent fever or shaking chills (Temperature higher than 100.4)  2.   Heavy bleeding (soaking more than 1 pad per hour); Passing clots  3.   Foul odor from vagina  4.   Mastitis (Breast infection; breast pain, chills, fever, redness)  5.   Urinary pain, burning or frequency  6.   Episiotomy infection  7.   Abdominal incision infection  8.   Severe depression longer than 24 hours    HAND WASHING  · Prior to handling the baby.  · Before breastfeeding or bottle feeding baby.  · After using the bathroom or changing the baby's diaper.    WOUND CARE  Ask your physician for additional care instructions.  In general:    ·  Incision:      · Keep clean and dry.    · Do NOT lift anything heavier than your baby for up to 6 weeks.    · There should not be any opening or pus.      VAGINAL CARE  · Nothing inside vagina for 6 weeks: no sexual intercourse, tampons or douching.  · Bleeding may continue for 2-4 weeks.  Amount may vary.    · Call your physician for heavy bleeding which means soaking more than 1 pad per hour    BIRTH CONTROL  · It is possible to become pregnant at any time after delivery and  "while breastfeeding.  · Plan to discuss a method of birth control with your physician at your follow up visit. visit.    DIET AND ELIMINATION  · Eating more fiber (bran cereal, fruits, and vegetables) and drinking plenty of fluids will help to avoid constipation.  · Urinary frequency after childbirth is normal.    POSTPARTUM BLUES  During the first few days after birth, you may experience a sense of the \"blues\" which may include impatience, irritability or even crying.  These feeling come and go quickly.  However, as many as 1 in 10 women experience emotional symptoms known as postpartum depression.    Postpartum depression:  May start as early as the second or third day after delivery or take several weeks or months to develop.  Symptoms of \"blues\" are present, but are more intense:  Crying spells; loss of appetite; feelings of hopelessness or loss of control; fear of touching the baby; over concern or no concern at all about the baby; little or no concern about your own appearance/caring for yourself; and/or inability to sleep or excessive sleeping.  Contact your physician if you are experiencing any of these symptoms.    Crisis Hotline:  · [place] Crisis Hotline:  2-704-VHNFYIO  Or 1-608.210.8022  · [place] Crisis Hotline:  1-883.956.4462  Or 670-273-6835    PREVENTING SHAKEN BABY:  If you are angry or stressed, PUT THE BABY IN THE CRIB, step away, take some deep breaths, and wait until you are calm to care for the baby.  DO NOT SHAKE THE BABY.  You are not alone, call a supporter for help.    · Crisis Call Center 24/7 crisis line 872-166-7823 or 1-369.192.4197  · You can also text them, text \"ANSWER\" to 046083    QUIT SMOKING/TOBACCO USE:  I understand the use of any tobacco products increases my chance of suffering from future heart disease and could cause other illnesses which may shorten my life. Quitting the use of tobacco products is the single most important thing I can do to improve my health. For further " information on smoking / tobacco cessation call a Toll Free Quit Line at 1-272.221.7147 (*[employer] [place]) or 1-618.563.3338 (American Lung Association) or you can access the web based program at www.lungusa.org.    · [place] Tobacco Users Help Line:  (245) 145-3179       Toll Free: 1-722.870.5756  · Quit Tobacco Program [facility] (321)338-3537    DEPRESSION / SUICIDE RISK:  As you are discharged from this [facility], it is important to learn how to keep safe from harming yourself.    Recognize the warning signs:  · Abrupt changes in personality, positive or negative- including increase in energy   · Giving away possessions  · Change in eating patterns- significant weight changes-  positive or negative  · Change in sleeping patterns- unable to sleep or sleeping all the time   · Unwillingness or inability to communicate  · Depression  · Unusual sadness, discouragement and loneliness  · Talk of wanting to die  · Neglect of personal appearance   · Rebelliousness- reckless behavior  · Withdrawal from people/activities they love  · Confusion- inability to concentrate     If you or a loved one observes any of these behaviors or has concerns about self-harm, here's what you can do:  · Talk about it- your feelings and reasons for harming yourself  · Remove any means that you might use to hurt yourself (examples: pills, rope, extension cords, firearm)  · Get professional help from the community (Mental Health, Substance Abuse, psychological counseling)  · Do not be alone:Call your Safe Contact- someone whom you trust who will be there for you.  · Call your local CRISIS HOTLINE 550-9787 or 445-409-5435  · Call your local Children's Mobile Crisis Response Team [place] (415) 510-7583 or www.[employer]  · Call the toll free National Suicide Prevention Hotlines   · National Suicide Prevention Lifeline 830-631-ECBO (0656)  · National Hope Line Network 800-SUICIDE  (815-4565)    DISCHARGE SURVEY:  Thank you for choosing [facility].  We hope we provided you with very good care.  You may be receiving a survey in the mail.  Please fill it out.  Your opinion is valuable to us.    ADDITIONAL EDUCATIONAL MATERIALS GIVEN TO PATIENT:        My signature on this form indicates that:  1.  I have reviewed and understand the above information  2.  My questions regarding this information have been answered to my satisfaction.  3.  I have formulated a plan with my discharge nurse to obtain my prescribed medication for home.

## 2018-12-01 NOTE — PROGRESS NOTES
POSTPARTUM DAY 1.5    No complaints.  Patient is doing well with infant care and lactation issues.  Patient is voiding well.    PE:    Afebrile  BP normal    Uterus involuting appropriately, nontender  Perineum:  No perineal complaints, so I didn't do specific perineal exam.  Calves nontender, Dony negative bilaterally.     LAB:        9/10/2018 10:11 11/29/2018 13:00 11/30/2018 07:54   WBC 8.4 12.9 (H) 17.5 (H)   Hemoglobin 12.0 12.7 9.7 (L)   Hematocrit 35.8 (L) 38.8 29.0 (L)   Platelet Count 218 206 185        PLAN: FeSO4 325 mg/day x 3 weeks.   Postpartum care.  Lactation consult.  Patient desires discharge:  today  .    Prescriptions:   PNV, FeSO4 325 mg/day x 3 weeks, percocet 10/325 #15, ibuprofen, colace .  Followup plans:   6 weeks .

## 2018-12-05 ENCOUNTER — HOSPITAL ENCOUNTER (OUTPATIENT)
Dept: LACTATION | Facility: MEDICAL CENTER | Age: 23
End: 2018-12-05

## 2018-12-06 NOTE — LACTATION NOTE
11/29/2018: 7# 9oz  12/04/2018: 7# 8oz (Ped)  12/05/2018: 7# 6.2oz (TLC) Intake: 56mls Left side only, with nipple shield    Sunitha has been feeding on one breast every 3-5 hours for up to 20 minutes, occasionally longer. She came in because latch is painful and her nipples are very sore. Jose Eduardo has some tongue restriction that causes her to bite and pinch the nipple when latching. We applied the 24mm nipple shield and Sunitha did not have any pain.     Plan: Feed with the nipple shield. Offer the breast every 2-3 hours during the day, up to 4 hours during the night. One breast is ok unless she still seems hungry after 20 minutes, then offer the second breast.     Suggested Haakaa pump to use on the side the baby is not taking. Discussed pumping around 2 weeks of age.    Follow Up: Weight check on Tuesday, 12.11.18 and will request a follow up appointment if she feels she is in need.

## 2020-10-28 ENCOUNTER — APPOINTMENT (OUTPATIENT)
Dept: LAB | Facility: MEDICAL CENTER | Age: 25
End: 2020-10-28
Attending: OBSTETRICS & GYNECOLOGY
Payer: COMMERCIAL

## 2020-10-29 ENCOUNTER — HOSPITAL ENCOUNTER (OUTPATIENT)
Dept: LAB | Facility: MEDICAL CENTER | Age: 25
End: 2020-10-29
Attending: OBSTETRICS & GYNECOLOGY
Payer: COMMERCIAL

## 2020-10-29 PROCEDURE — 84144 ASSAY OF PROGESTERONE: CPT

## 2020-10-29 PROCEDURE — 84702 CHORIONIC GONADOTROPIN TEST: CPT

## 2020-10-29 PROCEDURE — 36415 COLL VENOUS BLD VENIPUNCTURE: CPT

## 2020-10-30 LAB
B-HCG SERPL-ACNC: ABNORMAL MIU/ML (ref 0–5)
PROGEST SERPL-MCNC: 18 NG/ML

## 2020-10-31 ENCOUNTER — HOSPITAL ENCOUNTER (OUTPATIENT)
Dept: LAB | Facility: MEDICAL CENTER | Age: 25
End: 2020-10-31
Attending: OBSTETRICS & GYNECOLOGY
Payer: COMMERCIAL

## 2020-10-31 LAB — B-HCG SERPL-ACNC: ABNORMAL MIU/ML (ref 0–5)

## 2020-10-31 PROCEDURE — 84702 CHORIONIC GONADOTROPIN TEST: CPT

## 2020-10-31 PROCEDURE — 36415 COLL VENOUS BLD VENIPUNCTURE: CPT

## 2020-11-02 ENCOUNTER — HOSPITAL ENCOUNTER (OUTPATIENT)
Facility: MEDICAL CENTER | Age: 25
End: 2020-11-02
Attending: OBSTETRICS & GYNECOLOGY
Payer: COMMERCIAL

## 2020-11-02 LAB
25(OH)D3 SERPL-MCNC: 40 NG/ML (ref 30–100)
ABO GROUP BLD: NORMAL
BASOPHILS # BLD AUTO: 0.2 % (ref 0–1.8)
BASOPHILS # BLD: 0.01 K/UL (ref 0–0.12)
BLD GP AB SCN SERPL QL: NORMAL
EOSINOPHIL # BLD AUTO: 0.05 K/UL (ref 0–0.51)
EOSINOPHIL NFR BLD: 0.9 % (ref 0–6.9)
ERYTHROCYTE [DISTWIDTH] IN BLOOD BY AUTOMATED COUNT: 43.1 FL (ref 35.9–50)
HBV SURFACE AG SER QL: ABNORMAL
HCT VFR BLD AUTO: 42.4 % (ref 37–47)
HCV AB SER QL: NORMAL
HGB BLD-MCNC: 13.8 G/DL (ref 12–16)
HIV 1+2 AB+HIV1 P24 AG SERPL QL IA: NORMAL
IMM GRANULOCYTES # BLD AUTO: 0.02 K/UL (ref 0–0.11)
IMM GRANULOCYTES NFR BLD AUTO: 0.4 % (ref 0–0.9)
LYMPHOCYTES # BLD AUTO: 1.84 K/UL (ref 1–4.8)
LYMPHOCYTES NFR BLD: 34.5 % (ref 22–41)
MCH RBC QN AUTO: 31.2 PG (ref 27–33)
MCHC RBC AUTO-ENTMCNC: 32.5 G/DL (ref 33.6–35)
MCV RBC AUTO: 95.7 FL (ref 81.4–97.8)
MONOCYTES # BLD AUTO: 0.3 K/UL (ref 0–0.85)
MONOCYTES NFR BLD AUTO: 5.6 % (ref 0–13.4)
NEUTROPHILS # BLD AUTO: 3.11 K/UL (ref 2–7.15)
NEUTROPHILS NFR BLD: 58.4 % (ref 44–72)
NRBC # BLD AUTO: 0 K/UL
NRBC BLD-RTO: 0 /100 WBC
PLATELET # BLD AUTO: 264 K/UL (ref 164–446)
PMV BLD AUTO: 11.1 FL (ref 9–12.9)
RBC # BLD AUTO: 4.43 M/UL (ref 4.2–5.4)
RH BLD: NORMAL
RUBV AB SER QL: 17.7 IU/ML
TREPONEMA PALLIDUM IGG+IGM AB [PRESENCE] IN SERUM OR PLASMA BY IMMUNOASSAY: ABNORMAL
WBC # BLD AUTO: 5.3 K/UL (ref 4.8–10.8)

## 2020-11-02 PROCEDURE — 86780 TREPONEMA PALLIDUM: CPT

## 2020-11-02 PROCEDURE — 82306 VITAMIN D 25 HYDROXY: CPT

## 2020-11-02 PROCEDURE — 86900 BLOOD TYPING SEROLOGIC ABO: CPT

## 2020-11-02 PROCEDURE — 87340 HEPATITIS B SURFACE AG IA: CPT

## 2020-11-02 PROCEDURE — 87389 HIV-1 AG W/HIV-1&-2 AB AG IA: CPT

## 2020-11-02 PROCEDURE — 86901 BLOOD TYPING SEROLOGIC RH(D): CPT

## 2020-11-02 PROCEDURE — 86762 RUBELLA ANTIBODY: CPT

## 2020-11-02 PROCEDURE — 87086 URINE CULTURE/COLONY COUNT: CPT

## 2020-11-02 PROCEDURE — 86803 HEPATITIS C AB TEST: CPT

## 2020-11-02 PROCEDURE — 86850 RBC ANTIBODY SCREEN: CPT

## 2020-11-02 PROCEDURE — 85025 COMPLETE CBC W/AUTO DIFF WBC: CPT

## 2020-11-04 LAB
BACTERIA UR CULT: NORMAL
SIGNIFICANT IND 70042: NORMAL
SITE SITE: NORMAL
SOURCE SOURCE: NORMAL

## 2021-03-03 ENCOUNTER — HOSPITAL ENCOUNTER (OUTPATIENT)
Facility: MEDICAL CENTER | Age: 26
End: 2021-03-03
Attending: OBSTETRICS & GYNECOLOGY
Payer: COMMERCIAL

## 2021-03-03 LAB
BASOPHILS # BLD AUTO: 0.2 % (ref 0–1.8)
BASOPHILS # BLD: 0.02 K/UL (ref 0–0.12)
EOSINOPHIL # BLD AUTO: 0.08 K/UL (ref 0–0.51)
EOSINOPHIL NFR BLD: 0.9 % (ref 0–6.9)
ERYTHROCYTE [DISTWIDTH] IN BLOOD BY AUTOMATED COUNT: 46 FL (ref 35.9–50)
GLUCOSE 1H P 50 G GLC PO SERPL-MCNC: 109 MG/DL (ref 70–139)
HCT VFR BLD AUTO: 37.5 % (ref 37–47)
HGB BLD-MCNC: 12.3 G/DL (ref 12–16)
IMM GRANULOCYTES # BLD AUTO: 0.13 K/UL (ref 0–0.11)
IMM GRANULOCYTES NFR BLD AUTO: 1.4 % (ref 0–0.9)
LYMPHOCYTES # BLD AUTO: 1.57 K/UL (ref 1–4.8)
LYMPHOCYTES NFR BLD: 17.2 % (ref 22–41)
MCH RBC QN AUTO: 31.9 PG (ref 27–33)
MCHC RBC AUTO-ENTMCNC: 32.8 G/DL (ref 33.6–35)
MCV RBC AUTO: 97.4 FL (ref 81.4–97.8)
MONOCYTES # BLD AUTO: 0.59 K/UL (ref 0–0.85)
MONOCYTES NFR BLD AUTO: 6.4 % (ref 0–13.4)
NEUTROPHILS # BLD AUTO: 6.76 K/UL (ref 2–7.15)
NEUTROPHILS NFR BLD: 73.9 % (ref 44–72)
NRBC # BLD AUTO: 0 K/UL
NRBC BLD-RTO: 0 /100 WBC
PLATELET # BLD AUTO: 232 K/UL (ref 164–446)
PMV BLD AUTO: 11.1 FL (ref 9–12.9)
RBC # BLD AUTO: 3.85 M/UL (ref 4.2–5.4)
WBC # BLD AUTO: 9.2 K/UL (ref 4.8–10.8)

## 2021-03-03 PROCEDURE — 85025 COMPLETE CBC W/AUTO DIFF WBC: CPT

## 2021-03-03 PROCEDURE — 82950 GLUCOSE TEST: CPT

## 2021-03-03 PROCEDURE — 82306 VITAMIN D 25 HYDROXY: CPT

## 2021-03-03 PROCEDURE — 86780 TREPONEMA PALLIDUM: CPT

## 2021-03-04 LAB
25(OH)D3 SERPL-MCNC: 38 NG/ML (ref 30–100)
TREPONEMA PALLIDUM IGG+IGM AB [PRESENCE] IN SERUM OR PLASMA BY IMMUNOASSAY: NORMAL

## 2021-06-24 ENCOUNTER — HOSPITAL ENCOUNTER (INPATIENT)
Facility: MEDICAL CENTER | Age: 26
LOS: 2 days | End: 2021-06-26
Attending: OBSTETRICS & GYNECOLOGY | Admitting: OBSTETRICS & GYNECOLOGY
Payer: COMMERCIAL

## 2021-06-24 DIAGNOSIS — R52 PAIN RELATED TO VAGINAL DELIVERY: ICD-10-CM

## 2021-06-24 LAB
BASOPHILS # BLD AUTO: 0.2 % (ref 0–1.8)
BASOPHILS # BLD: 0.03 K/UL (ref 0–0.12)
EOSINOPHIL # BLD AUTO: 0.08 K/UL (ref 0–0.51)
EOSINOPHIL NFR BLD: 0.6 % (ref 0–6.9)
ERYTHROCYTE [DISTWIDTH] IN BLOOD BY AUTOMATED COUNT: 44.4 FL (ref 35.9–50)
HCT VFR BLD AUTO: 39.3 % (ref 37–47)
HGB BLD-MCNC: 13.2 G/DL (ref 12–16)
HOLDING TUBE BB 8507: NORMAL
IMM GRANULOCYTES # BLD AUTO: 0.16 K/UL (ref 0–0.11)
IMM GRANULOCYTES NFR BLD AUTO: 1.3 % (ref 0–0.9)
LYMPHOCYTES # BLD AUTO: 2.67 K/UL (ref 1–4.8)
LYMPHOCYTES NFR BLD: 21.4 % (ref 22–41)
MCH RBC QN AUTO: 31.1 PG (ref 27–33)
MCHC RBC AUTO-ENTMCNC: 33.6 G/DL (ref 33.6–35)
MCV RBC AUTO: 92.7 FL (ref 81.4–97.8)
MONOCYTES # BLD AUTO: 0.85 K/UL (ref 0–0.85)
MONOCYTES NFR BLD AUTO: 6.8 % (ref 0–13.4)
NEUTROPHILS # BLD AUTO: 8.7 K/UL (ref 2–7.15)
NEUTROPHILS NFR BLD: 69.7 % (ref 44–72)
NRBC # BLD AUTO: 0 K/UL
NRBC BLD-RTO: 0 /100 WBC
PLATELET # BLD AUTO: 244 K/UL (ref 164–446)
PMV BLD AUTO: 11 FL (ref 9–12.9)
RBC # BLD AUTO: 4.24 M/UL (ref 4.2–5.4)
WBC # BLD AUTO: 12.5 K/UL (ref 4.8–10.8)

## 2021-06-24 PROCEDURE — U0003 INFECTIOUS AGENT DETECTION BY NUCLEIC ACID (DNA OR RNA); SEVERE ACUTE RESPIRATORY SYNDROME CORONAVIRUS 2 (SARS-COV-2) (CORONAVIRUS DISEASE [COVID-19]), AMPLIFIED PROBE TECHNIQUE, MAKING USE OF HIGH THROUGHPUT TECHNOLOGIES AS DESCRIBED BY CMS-2020-01-R: HCPCS

## 2021-06-24 PROCEDURE — 302449 STATCHG TRIAGE ONLY (STATISTIC)

## 2021-06-24 PROCEDURE — 87426 SARSCOV CORONAVIRUS AG IA: CPT

## 2021-06-24 PROCEDURE — 85025 COMPLETE CBC W/AUTO DIFF WBC: CPT

## 2021-06-24 PROCEDURE — 770002 HCHG ROOM/CARE - OB PRIVATE (112)

## 2021-06-24 PROCEDURE — 700111 HCHG RX REV CODE 636 W/ 250 OVERRIDE (IP): Performed by: OBSTETRICS & GYNECOLOGY

## 2021-06-24 PROCEDURE — U0005 INFEC AGEN DETEC AMPLI PROBE: HCPCS

## 2021-06-24 RX ORDER — LIDOCAINE HYDROCHLORIDE 10 MG/ML
INJECTION, SOLUTION INFILTRATION; PERINEURAL
Status: COMPLETED
Start: 2021-06-24 | End: 2021-06-25

## 2021-06-24 RX ORDER — MISOPROSTOL 200 UG/1
800 TABLET ORAL
Status: DISCONTINUED | OUTPATIENT
Start: 2021-06-24 | End: 2021-06-25 | Stop reason: HOSPADM

## 2021-06-24 RX ADMIN — FENTANYL CITRATE 100 MCG: 50 INJECTION, SOLUTION INTRAMUSCULAR; INTRAVENOUS at 23:57

## 2021-06-25 PROBLEM — Z3A.01 LESS THAN 8 WEEKS GESTATION OF PREGNANCY: Status: RESOLVED | Noted: 2018-04-13 | Resolved: 2021-06-25

## 2021-06-25 LAB
ERYTHROCYTE [DISTWIDTH] IN BLOOD BY AUTOMATED COUNT: 45.1 FL (ref 35.9–50)
HCT VFR BLD AUTO: 34.9 % (ref 37–47)
HGB BLD-MCNC: 11.5 G/DL (ref 12–16)
MCH RBC QN AUTO: 31.1 PG (ref 27–33)
MCHC RBC AUTO-ENTMCNC: 33 G/DL (ref 33.6–35)
MCV RBC AUTO: 94.3 FL (ref 81.4–97.8)
PLATELET # BLD AUTO: 219 K/UL (ref 164–446)
PMV BLD AUTO: 11.4 FL (ref 9–12.9)
RBC # BLD AUTO: 3.7 M/UL (ref 4.2–5.4)
SARS-COV+SARS-COV-2 AG RESP QL IA.RAPID: NOTDETECTED
SARS-COV-2 RNA RESP QL NAA+PROBE: NOTDETECTED
SPECIMEN SOURCE: NORMAL
SPECIMEN SOURCE: NORMAL
WBC # BLD AUTO: 15.7 K/UL (ref 4.8–10.8)

## 2021-06-25 PROCEDURE — 36415 COLL VENOUS BLD VENIPUNCTURE: CPT

## 2021-06-25 PROCEDURE — 0UQMXZZ REPAIR VULVA, EXTERNAL APPROACH: ICD-10-PCS | Performed by: NURSE PRACTITIONER

## 2021-06-25 PROCEDURE — 770002 HCHG ROOM/CARE - OB PRIVATE (112)

## 2021-06-25 PROCEDURE — A9270 NON-COVERED ITEM OR SERVICE: HCPCS | Performed by: OBSTETRICS & GYNECOLOGY

## 2021-06-25 PROCEDURE — 700111 HCHG RX REV CODE 636 W/ 250 OVERRIDE (IP): Performed by: OBSTETRICS & GYNECOLOGY

## 2021-06-25 PROCEDURE — 700111 HCHG RX REV CODE 636 W/ 250 OVERRIDE (IP)

## 2021-06-25 PROCEDURE — 0HQ9XZZ REPAIR PERINEUM SKIN, EXTERNAL APPROACH: ICD-10-PCS | Performed by: NURSE PRACTITIONER

## 2021-06-25 PROCEDURE — 700101 HCHG RX REV CODE 250

## 2021-06-25 PROCEDURE — 700102 HCHG RX REV CODE 250 W/ 637 OVERRIDE(OP): Performed by: OBSTETRICS & GYNECOLOGY

## 2021-06-25 PROCEDURE — 85027 COMPLETE CBC AUTOMATED: CPT

## 2021-06-25 PROCEDURE — 59409 OBSTETRICAL CARE: CPT

## 2021-06-25 PROCEDURE — 304965 HCHG RECOVERY SERVICES

## 2021-06-25 RX ORDER — OXYCODONE HYDROCHLORIDE AND ACETAMINOPHEN 5; 325 MG/1; MG/1
2 TABLET ORAL EVERY 4 HOURS PRN
Status: DISCONTINUED | OUTPATIENT
Start: 2021-06-25 | End: 2021-06-26 | Stop reason: HOSPADM

## 2021-06-25 RX ORDER — IBUPROFEN 600 MG/1
600 TABLET ORAL EVERY 6 HOURS PRN
Status: DISCONTINUED | OUTPATIENT
Start: 2021-06-25 | End: 2021-06-26 | Stop reason: HOSPADM

## 2021-06-25 RX ORDER — VITAMIN A ACETATE, BETA CAROTENE, ASCORBIC ACID, CHOLECALCIFEROL, .ALPHA.-TOCOPHEROL ACETATE, DL-, THIAMINE MONONITRATE, RIBOFLAVIN, NIACINAMIDE, PYRIDOXINE HYDROCHLORIDE, FOLIC ACID, CYANOCOBALAMIN, CALCIUM CARBONATE, FERROUS FUMARATE, ZINC OXIDE, CUPRIC OXIDE 3080; 12; 120; 400; 1; 1.84; 3; 20; 22; 920; 25; 200; 27; 10; 2 [IU]/1; UG/1; MG/1; [IU]/1; MG/1; MG/1; MG/1; MG/1; MG/1; [IU]/1; MG/1; MG/1; MG/1; MG/1; MG/1
1 TABLET, FILM COATED ORAL
Status: DISCONTINUED | OUTPATIENT
Start: 2021-06-25 | End: 2021-06-26 | Stop reason: HOSPADM

## 2021-06-25 RX ORDER — MISOPROSTOL 200 UG/1
800 TABLET ORAL
Status: DISCONTINUED | OUTPATIENT
Start: 2021-06-25 | End: 2021-06-26 | Stop reason: HOSPADM

## 2021-06-25 RX ORDER — IBUPROFEN 600 MG/1
600 TABLET ORAL EVERY 6 HOURS PRN
Qty: 30 TABLET | Refills: 0 | Status: SHIPPED | OUTPATIENT
Start: 2021-06-25 | End: 2021-06-26

## 2021-06-25 RX ORDER — OXYCODONE HYDROCHLORIDE AND ACETAMINOPHEN 5; 325 MG/1; MG/1
1 TABLET ORAL EVERY 4 HOURS PRN
Status: DISCONTINUED | OUTPATIENT
Start: 2021-06-25 | End: 2021-06-26 | Stop reason: HOSPADM

## 2021-06-25 RX ORDER — ONDANSETRON 4 MG/1
4 TABLET, ORALLY DISINTEGRATING ORAL EVERY 6 HOURS PRN
Status: DISCONTINUED | OUTPATIENT
Start: 2021-06-25 | End: 2021-06-26 | Stop reason: HOSPADM

## 2021-06-25 RX ORDER — ONDANSETRON 2 MG/ML
4 INJECTION INTRAMUSCULAR; INTRAVENOUS EVERY 6 HOURS PRN
Status: DISCONTINUED | OUTPATIENT
Start: 2021-06-25 | End: 2021-06-26 | Stop reason: HOSPADM

## 2021-06-25 RX ORDER — DOCUSATE SODIUM 100 MG/1
100 CAPSULE, LIQUID FILLED ORAL 2 TIMES DAILY
Status: DISCONTINUED | OUTPATIENT
Start: 2021-06-25 | End: 2021-06-26 | Stop reason: HOSPADM

## 2021-06-25 RX ORDER — SODIUM CHLORIDE, SODIUM LACTATE, POTASSIUM CHLORIDE, CALCIUM CHLORIDE 600; 310; 30; 20 MG/100ML; MG/100ML; MG/100ML; MG/100ML
INJECTION, SOLUTION INTRAVENOUS PRN
Status: DISCONTINUED | OUTPATIENT
Start: 2021-06-25 | End: 2021-06-26 | Stop reason: HOSPADM

## 2021-06-25 RX ADMIN — IBUPROFEN 600 MG: 600 TABLET, FILM COATED ORAL at 15:50

## 2021-06-25 RX ADMIN — LIDOCAINE HYDROCHLORIDE: 10 INJECTION, SOLUTION INFILTRATION; PERINEURAL at 00:55

## 2021-06-25 RX ADMIN — OXYTOCIN 20 UNITS: 10 INJECTION, SOLUTION INTRAMUSCULAR; INTRAVENOUS at 00:52

## 2021-06-25 RX ADMIN — PRENATAL WITH FERROUS FUM AND FOLIC ACID 1 TABLET: 3080; 920; 120; 400; 22; 1.84; 3; 20; 10; 1; 12; 200; 27; 25; 2 TABLET ORAL at 08:29

## 2021-06-25 RX ADMIN — OXYCODONE HYDROCHLORIDE AND ACETAMINOPHEN 1 TABLET: 5; 325 TABLET ORAL at 19:51

## 2021-06-25 RX ADMIN — DOCUSATE SODIUM 100 MG: 100 CAPSULE, LIQUID FILLED ORAL at 05:03

## 2021-06-25 RX ADMIN — OXYCODONE HYDROCHLORIDE AND ACETAMINOPHEN 1 TABLET: 5; 325 TABLET ORAL at 12:35

## 2021-06-25 RX ADMIN — OXYCODONE HYDROCHLORIDE AND ACETAMINOPHEN 1 TABLET: 5; 325 TABLET ORAL at 05:03

## 2021-06-25 RX ADMIN — IBUPROFEN 600 MG: 600 TABLET, FILM COATED ORAL at 01:46

## 2021-06-25 RX ADMIN — DOCUSATE SODIUM 100 MG: 100 CAPSULE, LIQUID FILLED ORAL at 17:56

## 2021-06-25 RX ADMIN — OXYTOCIN 125 ML/HR: 10 INJECTION, SOLUTION INTRAMUSCULAR; INTRAVENOUS at 01:48

## 2021-06-25 ASSESSMENT — PAIN DESCRIPTION - PAIN TYPE
TYPE: ACUTE PAIN

## 2021-06-25 ASSESSMENT — PATIENT HEALTH QUESTIONNAIRE - PHQ9
SUM OF ALL RESPONSES TO PHQ9 QUESTIONS 1 AND 2: 0
SUM OF ALL RESPONSES TO PHQ9 QUESTIONS 1 AND 2: 0
1. LITTLE INTEREST OR PLEASURE IN DOING THINGS: NOT AT ALL
2. FEELING DOWN, DEPRESSED, IRRITABLE, OR HOPELESS: NOT AT ALL
1. LITTLE INTEREST OR PLEASURE IN DOING THINGS: NOT AT ALL

## 2021-06-25 NOTE — CARE PLAN
The patient is Stable - Low risk of patient condition declining or worsening    Shift Goals  Clinical Goals: maintain tolerable pain level     Progress made toward(s) clinical / shift goals:  Patient has demonstrated adequate pain control with current regimen.    Patient is not progressing towards the following goals: NA

## 2021-06-25 NOTE — PROGRESS NOTES
SPONTANEOUS VAGINAL DELIVERY PROCEDURE NOTE    PATIENT ID:  NAME:  Sunitha Patton  MRN:               [id number]  YOB: 1995    Called to bedside for imminent delivery. Pt spontaneously pushing with CTX.       On 2021  at 00:37, this 26 y.o., now  40w0d , GBS negative female delivered via  under no anesthesia a viable female infant weight pending transition with APGAR scores of 7 and 9 at one and five minutes. The infant head delivered in JUAN ANTONIO. There was a double nuchal cord which was reduced, and infant was bulb suctioned at delivery. Cord was doubly clamped, cut and the rest of the infants body delivered easily. The infant was initially placed on mothers abdomen and cord clamping was delayed. The cord was then clamped and cut. An intact placenta was delivered spontaneously at 00:52 with 3 vessel cord. Upon vaginal exam, there was a 1st degree perineal and RT labial  laceration which were repaired using 3.0 Chromic in the usual sterile fashion. Estimated blood loss was 300cc. Patient will be transferred to postpartum in stable condition and infant to  nursery.      Delivery attended by Raisa Galan CNM, APRN.

## 2021-06-25 NOTE — LACTATION NOTE
This note was copied from a baby's chart.  LC stopped in to see if I could observe baby with BF. Mom says baby had just had a bath, she did try to latch, but baby was too sleepy. Encouraged Mom to continue to do HE and spoon feeding with baby, and to call for next feeding prn.

## 2021-06-25 NOTE — PROGRESS NOTES
0930 Assumed care of patient, assessment completed. Fundus firm , lochia light . Denies pain needs at this time. Instructed to call for assistance, pain medication, or with any questions. No further needs identified at this time.

## 2021-06-25 NOTE — LACTATION NOTE
This note was copied from a baby's chart.  Multip Mom says she had some trouble with BF her first baby and started supplementing, baby stopped BF around week 4. She states she would like to BF with this baby. Baby is currently sleepy. Roused baby to feed and latched baby in football hold on left side, but baby quickly went back to sleep. Taught Mom how to do HE, to aid with let down and latching prior to feeding. Also to do HE and spoon feeding to get more breast stimulation for supply initiation, and to wake/feed baby. Provided written resources for [facility] and Zoom Meeting. Encouraged to call for any additional LC help prn.

## 2021-06-25 NOTE — PROGRESS NOTES
Received patient from labor and delivery via wheelchair with Raisa SUBRAMANIAN.  Assessment done. Fundus firm. Lochia light. Instructed patient to increase fluid intake and to void as needed and to watch for increased bleeding. Pt wanted to breastfeed and asked for assistance. Attempted with cross cradle first and then moved to football hold. Baby did latch for a few sucks and then tired out. Pt does have colostrum present. Showed pt how to hand express. Call light within reach. Encouraged pt to call with any needs, questions, concerns.

## 2021-06-25 NOTE — CARE PLAN
The patient is Stable - Low risk of patient condition declining or worsening    Shift Goals  Clinical Goals: maintain tolerable pain level     Progress made toward(s) clinical / shift goals:  pt pain was increasing so percocet was given this morning. Pt asleep now, but aware to call for pain medication as needed.    Patient is not progressing towards the following goals:

## 2021-06-25 NOTE — PROGRESS NOTES
Received beside report from [facility] RN; Patient is a , EDC of  with a gestational age of 39w6d. RN assuming care of patient; all questions answered; patient resting comfortably in bed with significant other at bedside, denies needs at this time; vital signs in stable condition; patient educated regarding call light system; call light and patient belongings in reach; patient encouraged to call RN for any needs, wants, desires or concerns. Whiteboard updated.   IV started, albs drawn and sent.    Dr. Dowd at bedside, discussing AROM, patint able ot ask questions, no questions for MD, AROM clear fluid noted. SVE -2.   0034 SVE complete, notified Young who was standing in for delivery due to Dr. Vaughan in another delivery.   0035 VERONICA Galan at bedside.   0037 Delivery of viable female at 40 weeks + 0 days with clear fluid. Young delivered, infant to mother chest dried and stimulated with warm towel. Infant in stable condition, apgars 7/9 placed to mother's chest skin to skin. Delivery bands placed on infant's ankle and wrist by transition RN, band placed on patient's wrist, and second adult band placed on Bunny's wrist.   0052 Placenta delivered, spontaneously and intact.    0100 Count correct. Fundus firm.   0200 Patient ambulated to restroom to void, patient able to void, tolerated well; pericare provided.   0230 Patient transferred to Postpartum unit with baby in arms, both in stable condition; bedside report given to Sharee; Postpartum Unit RN assumed care; plan of care discussed; vital signs stable; cuddles and bands verified with both RNs at bedside, remaining pitocin bag programmed on pump.

## 2021-06-25 NOTE — CARE PLAN
Problem: Psychosocial - L&D  Goal: Patient's level of anxiety will decrease  Outcome: Met  Goal: Patient will be able to discuss coping skills during hospitalization  Outcome: Met     Problem: Risk for Infection and Impaired Wound Healing  Goal: Patient will remain free from infection  Outcome: Met     Problem: Risk for Fluid Imbalance  Goal: Patient's fluid volume balance will be maintained or improve  Outcome: Met     Problem: Risk for Injury  Goal: Patient and fetus will be free of preventable injury/complications  Outcome: Met     Problem: Pain  Goal: Patient's pain will be alleviated or reduced to the patient’s comfort goal  Outcome: Met     Problem: Discharge Barriers/Planning  Goal: Patient's continuum of care needs are met  Outcome: Met   The patient is Stable - Low risk of patient condition declining or worsening      Progress made toward(s) clinical / shift goals:  RN will assess for signs and symptoms of infection and implement stand precautions and perform hand hygiene before and after patient contact. Patient verbalized understanding for need in proper hand hygiene.  RN placed EFM and toco to ensure fetal well being and monitor uterine activity. RN educated patient on need for monitors and patient verbalized understanding.

## 2021-06-25 NOTE — PROGRESS NOTES
- pt is a , 39.6 weeks gestation IUP, with scheduled IOL 0800 tomorrow for post dates, 2cm in office on Tuesday, with c/o uc's since 300 now q5 minutes and spotting with mucus. No c/o lof, or dfm. efm and toco placed, vss. SVE performed, 3-4/80/-2.   - Dr. Vaughan called and given report, received orders to admit for labor, telephone orders placed. Called when admitted and will AROM with pt approval. Discussed poc with pt.  - report given to MORIS Wilder

## 2021-06-25 NOTE — H&P
"Labor and Delivery History and Physical    CC: Contractions    HPI: 25yo  at 39w6d, EDC 21. Pt presents to L&D for contractions. No VB or LOF. Good fetal movements. Scheduled for induction of labor in the morning. Her pregnancy is uncomplicated. She receives prenatal care with Dr. Leonard.    All other systems were reviewed and were negative.    PMH: healthy  PSH: none  OB HX: 19 -  at term, female, 7lb 9oz, no complications  Gyn Hx: nl abnl paps, no hx STIs  SH: no T/E/D  Meds: PNV  Allergies: NKDA    /72   Pulse 96   Temp 36.1 °C (97 °F) (Temporal)   Resp 18   Ht 1.702 m (5' 7\")   Wt 81.6 kg (180 lb)   SpO2 97%   BMI 28.19 kg/m²     Physical Exam  Gen: NAD  Neck: supple, NT  Resp: no distress  Abd: soft, gravid, non tender  Ext: no edema, NT    FHT: 140, pos accels, neg decels, moderate variability, cat 1 FHR, reactive  [place]: every 2-3 min  SVE: 3-4/80/-2    Laboratory Evaluation  ABO: A+/-  H/H: 12.3/37.5  GBS neg  GTT: 109  CF neg  AFP neg  Rubella: Immune  HIV: Neg  RPR: NR  HepBsAg: neg    Assessment:   25yo  at 39w6d  Early active labor  GBS neg    Plan:  Admit to L&D  Fetal monitoring and toco  IVF  Pt requests AROM for augmentation of labor & requests un-medicated labor process  Will AROM once IV placed  Anticipate vaginal delivery        Hannah Dowd M.D.      "

## 2021-06-26 ENCOUNTER — PHARMACY VISIT (OUTPATIENT)
Dept: PHARMACY | Facility: MEDICAL CENTER | Age: 26
End: 2021-06-26
Payer: COMMERCIAL

## 2021-06-26 VITALS
HEIGHT: 67 IN | TEMPERATURE: 97.9 F | BODY MASS INDEX: 28.25 KG/M2 | DIASTOLIC BLOOD PRESSURE: 62 MMHG | OXYGEN SATURATION: 97 % | RESPIRATION RATE: 18 BRPM | HEART RATE: 66 BPM | SYSTOLIC BLOOD PRESSURE: 98 MMHG | WEIGHT: 180 LBS

## 2021-06-26 PROBLEM — R52 PAIN RELATED TO VAGINAL DELIVERY: Status: ACTIVE | Noted: 2021-06-26

## 2021-06-26 PROCEDURE — 700102 HCHG RX REV CODE 250 W/ 637 OVERRIDE(OP): Performed by: OBSTETRICS & GYNECOLOGY

## 2021-06-26 PROCEDURE — RXMED WILLOW AMBULATORY MEDICATION CHARGE: Performed by: OBSTETRICS & GYNECOLOGY

## 2021-06-26 PROCEDURE — A9270 NON-COVERED ITEM OR SERVICE: HCPCS | Performed by: OBSTETRICS & GYNECOLOGY

## 2021-06-26 RX ORDER — OXYCODONE HYDROCHLORIDE AND ACETAMINOPHEN 5; 325 MG/1; MG/1
1 TABLET ORAL EVERY 6 HOURS PRN
Qty: 12 TABLET | Refills: 0 | Status: SHIPPED | OUTPATIENT
Start: 2021-06-26 | End: 2021-06-29

## 2021-06-26 RX ORDER — IBUPROFEN 600 MG/1
600 TABLET ORAL EVERY 6 HOURS PRN
Qty: 30 TABLET | Refills: 0 | Status: SHIPPED | OUTPATIENT
Start: 2021-06-26 | End: 2022-03-08

## 2021-06-26 RX ADMIN — DOCUSATE SODIUM 100 MG: 100 CAPSULE, LIQUID FILLED ORAL at 06:32

## 2021-06-26 RX ADMIN — PRENATAL WITH FERROUS FUM AND FOLIC ACID 1 TABLET: 3080; 920; 120; 400; 22; 1.84; 3; 20; 10; 1; 12; 200; 27; 25; 2 TABLET ORAL at 08:03

## 2021-06-26 RX ADMIN — IBUPROFEN 600 MG: 600 TABLET, FILM COATED ORAL at 06:32

## 2021-06-26 ASSESSMENT — EDINBURGH POSTNATAL DEPRESSION SCALE (EPDS)
I HAVE BEEN SO UNHAPPY THAT I HAVE BEEN CRYING: NO, NEVER
I HAVE BEEN ABLE TO LAUGH AND SEE THE FUNNY SIDE OF THINGS: AS MUCH AS I ALWAYS COULD
I HAVE FELT SCARED OR PANICKY FOR NO GOOD REASON: NO, NOT AT ALL
THE THOUGHT OF HARMING MYSELF HAS OCCURRED TO ME: NEVER
I HAVE FELT SAD OR MISERABLE: NO, NOT AT ALL
I HAVE BEEN SO UNHAPPY THAT I HAVE HAD DIFFICULTY SLEEPING: NOT AT ALL
I HAVE BLAMED MYSELF UNNECESSARILY WHEN THINGS WENT WRONG: NO, NEVER
THINGS HAVE BEEN GETTING ON TOP OF ME: NO, I HAVE BEEN COPING AS WELL AS EVER
I HAVE LOOKED FORWARD WITH ENJOYMENT TO THINGS: AS MUCH AS I EVER DID
I HAVE BEEN ANXIOUS OR WORRIED FOR NO GOOD REASON: HARDLY EVER

## 2021-06-26 ASSESSMENT — PAIN DESCRIPTION - PAIN TYPE
TYPE: ACUTE PAIN
TYPE: ACUTE PAIN

## 2021-06-26 NOTE — PROGRESS NOTES
0800 Assumed care of patient, assessment completed. Fundus firm , lochia scant . Denies pain needs at this time. Instructed to call for assistance, pain medication, or with any questions. No further needs identified at this time.    1100 Discharge instructions reviewed with patient. All questions answered, verbalized understanding. Paperwork given to patient, copy signed and placed in chart.

## 2021-06-26 NOTE — DISCHARGE INSTRUCTIONS
PATIENT DISCHARGE EDUCATION INSTRUCTION SHEET  REASONS TO CALL YOUR OBSTETRICIAN  · Persistent fever, shaking, chills (Temperature higher than 100.4) may indicate you have an infection  · Heavy bleeding: soaking more than 1 pad per hour; Passing clots an egg-sized clot or bigger may mean you have an postpartum hemorrhage  · Foul odor from vagina or bad smelling or discolored discharge or blood  · Breast infection (Mastitis symptoms); breast pain, chills, fever, redness or red streaks, may feel flu like symptoms  · Urinary pain, burning or frequency  · Incision that is not healing, increased redness, swelling, tenderness or pain, or any pus from episiotomy or  site may mean you have an infection  · Redness, swelling, warmth, or painful to touch in the calf area of your leg may mean you have a blood clot  · Severe or intensified depression, thoughts or feelings of wanting to hurt yourself or someone else   · Pain in chest, obstructed breathing or shortness of breath (trouble catching your breath) may mean you are having a postpartum complication. Call your provider immediately   · Headache that does not get better, even after taking medicine, a bad headache with vision changes or pain in the upper right area of your belly may mean you have high blood pressure or post birth preeclampsia. Call your provider immediately    HAND WASHING  All family and friends should wash their hands:  · Before and after holding the baby  · Before feeding the baby  · After using the restroom or changing the baby's diaper    WOUND CARE  Ask your physician for additional care instructions. In general:  ·  Incision:  · May shower and pat incision dry   · Keep the incision clean and dry  · There should not be any opening or pus from the incision  · Continue to walk at home 3 times a day   · Do NOT lift anything heavier than your baby (over 10 pounds)  · Encourage family to help participate in care of the  to allow  rest and mom time to heal  · Episiotomy/Laceration  · May use otilio-spray bottle, witch hazel pads and dermaplast spray for comfort  · Use otilio-spray bottle after urinating to cleanse perineal area  · To prevent burning during urination spray otilio-water bottle on labial area   · Pat perineal area dry until episiotomy/laceration is healed  · Continue to use otilio-bottle until bleeding stops as needed  · If have a 2nd degree laceration or greater, a Sitz bath can offer relief from soreness, burning, and inflammation   · Sitz Bath   · Sit in 6 inches of warm water and soak laceration as needed until the laceration heals    VAGINAL CARE AND BLEEDING  · Nothing inside vagina for 6 weeks:   · No sexual intercourse, tampons or douching  · Bleeding may continue for 2-4 weeks. Amount and color may vary  · Soaking 1 pad or more in an hour for several hours is considered heavy bleeding  · Passing large egg sized blood clots can be concerning  · If you feel like you have heavy bleeding or are having increasing amount of blood clots call your Obstetrician immediately  · If you begin feeling faint upon standing, feeling sick to your stomach, have clammy skin, a really fast heartbeat, have chills, start feeling confused, dizzy, sleepy or weak, or feeling like you're going to faint call your Obstetrician immediately    HYPERTENSION   Preeclampsia or gestational hypertension are types of high blood pressure that only pregnant women can get. It is important for you to be aware of symptoms to seek early intervention and treatment. If you have any of these symptoms immediately call your Obstetrician    · Vision changes or blurred vision   · Severe headache or pain that is unrelieved with medication and will not go away  · Persistent pain in upper abdomen or shoulder   · Increased swelling of face, feet, or hands  · Difficulty breathing or shortness of breath at rest  · Urinating less than usual    URINATION AND BOWEL MOVEMENTS  · Eating  "more fiber (bran cereal, fruits, and vegetables) and drinking plenty of fluids will help to avoid constipation  · Urinary frequency and urgency after childbirth is normal  · If you experience any urinary pain, burning or frequency call your provider    BIRTH CONTROL  · It is possible to become pregnant at any time after delivery and while breastfeeding  · Plan to discuss a method of birth control with your physician at your post delivery follow up visit    POSTPARTUM BLUES  During the first few days after birth, you may experience a sense of the \"blues\" which may include impatience, irritability or even crying. These feelings come and go quickly. However, as many as 1 in 10 women experience emotional symptoms known as postpartum depression.     POSTPARTUM DEPRESSION    May start as early as the second or third day after delivery or take several weeks or months to develop. Symptoms of \"blues\" are present, but are more intense: Crying spells; loss of appetite; feelings of hopelessness or loss of control; fear of touching the baby; over concern or no concern at all about the baby; little or no concern about your own appearance/caring for yourself; and/or inability to sleep or excessive sleeping. Contact your Obstetrician if you are experiencing any of these symptoms     PREVENTING SHAKEN BABY  If you are angry or stressed, PUT THE BABY IN THE CRIB, step away, take some deep breaths, and wait until you are calm to care for the baby. DO NOT SHAKE THE BABY. You are not alone, call a supporter for help.  · Crisis Call Center 24/7 crisis call line (651-554-0261) or (1-353.477.4994)  · You can also text them, text \"ANSWER\" (869943)      "

## 2021-06-26 NOTE — LACTATION NOTE
This note was copied from a baby's chart.  Mother latching baby and reports that feeding at breast is going well. Latch improved overnight. Resources post discharge discussed.

## 2021-06-26 NOTE — DISCHARGE SUMMARY
"Discharge Summary    Admission Date: 21    Discharge Date: 21    Diagnosis:Active Problems:     (spontaneous vaginal delivery)    Subjective: Pain controlled. Normal lochia. Eating, voiding and ambulating without difficulty.Breast feeding    /63   Pulse 71   Temp 36.2 °C (97.2 °F) (Temporal)   Resp 17   Ht 1.702 m (5' 7\")   Wt 81.6 kg (180 lb)   SpO2 96%   Breastfeeding Yes   BMI 28.19 kg/m²     GEN: NAD  GI:soft, NT, ND  :fundus firm and below umbilicus  EXT:no edema    Hospital Course:Sunitha Patton is a 27 yo  admitted at 39w6d in labor. She is s/p  of a viable female infant with APGARS 7/9. EBL of 300cc. First degree and right perineal laceration repaired. She was meeting all post partum goals and was stable for discharge home on PPD#1.    Discharge Instructions   1. Diet : general  2. Activity: Pelvic rest for 6 weeks     Sunitha Pattonkermit   Home Medication Instructions MARIE:[id number]    Printed on:21 0634   Medication Information                      docusate sodium 100 MG Cap  Take 100 mg by mouth 2 times a day as needed for Constipation.             ibuprofen (MOTRIN) 600 MG Tab  Take 1 tablet by mouth every 6 hours as needed (For cramping after delivery; do not give if patient is receiving ketorolac (Toradol)).             oxyCODONE-acetaminophen (PERCOCET) 5-325 MG Tab  Take 1 tablet by mouth every 6 hours as needed for up to 3 days.             Prenatal MV-Min-Fe Fum-FA-DHA (PRENATAL 1 PO)  Take  by mouth.                 Follow up: 6 weeks    Complications:none    Jhonatan Elizalde M.D.    "

## 2021-06-26 NOTE — DISCHARGE PLANNING
Meds-to-Beds: Discharge prescription orders listed below delivered to patient's bedside Aria. MORIS Verdugo notified. Written information regarding the dispensed prescriptions was provided to the patient including the phone number of the pharmacy to call for any questions.         Sunitha Patton   Home Medication Instructions MARIE:[id number]    Printed on:06/26/21 1306   Medication Information                      ibuprofen (MOTRIN) 600 MG Tab  Take 1 tablet by mouth every 6 hours as needed (For cramping after delivery)             oxyCODONE-acetaminophen (PERCOCET) 5-325 MG Tab  Take 1 tablet by mouth every 6 hours as needed for up to 3 days.                 Marija Barrera, PharmD

## 2021-06-26 NOTE — CARE PLAN
The patient is stable at this time.   Shift Goals  Clinical Goals: maintain tolerable pain level     Progress made toward(s) clinical / shift goals: progressing       Problem: Pain - Standard  Goal: Alleviation of pain or a reduction in pain to the patient’s comfort goal  Outcome: Progressing  Note: Pain control at this time. Will be medicated as needed.      Problem: Altered Physiologic Condition  Goal: Patient physiologically stable as evidenced by normal lochia, palpable uterine involution and vitals within normal limits  Outcome: Progressing  Note: Fundus firm at U, lochia rubra minimal. V/S WNL

## 2022-03-08 ENCOUNTER — OFFICE VISIT (OUTPATIENT)
Dept: INTERNAL MEDICINE | Facility: OTHER | Age: 27
End: 2022-03-08
Payer: COMMERCIAL

## 2022-03-08 VITALS
SYSTOLIC BLOOD PRESSURE: 107 MMHG | HEIGHT: 69 IN | TEMPERATURE: 98.2 F | OXYGEN SATURATION: 98 % | BODY MASS INDEX: 22.51 KG/M2 | WEIGHT: 152 LBS | HEART RATE: 60 BPM | DIASTOLIC BLOOD PRESSURE: 75 MMHG

## 2022-03-08 DIAGNOSIS — Z76.89 ENCOUNTER TO ESTABLISH CARE WITH NEW DOCTOR: ICD-10-CM

## 2022-03-08 DIAGNOSIS — S89.91XS INJURY OF RIGHT KNEE, SEQUELA: ICD-10-CM

## 2022-03-08 PROCEDURE — 99203 OFFICE O/P NEW LOW 30 MIN: CPT | Mod: GC

## 2022-03-08 RX ORDER — CYCLOBENZAPRINE HCL 10 MG
TABLET ORAL
COMMUNITY
Start: 2022-01-24 | End: 2022-03-08

## 2022-03-08 RX ORDER — GABAPENTIN 300 MG/1
CAPSULE ORAL
COMMUNITY
Start: 2022-01-24 | End: 2022-03-08

## 2022-03-08 RX ORDER — SCOLOPAMINE TRANSDERMAL SYSTEM 1 MG/1
PATCH, EXTENDED RELEASE TRANSDERMAL
COMMUNITY
Start: 2022-01-24 | End: 2022-03-08

## 2022-03-08 RX ORDER — ONDANSETRON 4 MG/1
TABLET, ORALLY DISINTEGRATING ORAL
COMMUNITY
Start: 2022-01-24 | End: 2022-03-08

## 2022-03-08 RX ORDER — CEPHALEXIN 500 MG/1
CAPSULE ORAL
COMMUNITY
Start: 2022-01-24 | End: 2022-03-08

## 2022-03-08 ASSESSMENT — PATIENT HEALTH QUESTIONNAIRE - PHQ9: CLINICAL INTERPRETATION OF PHQ2 SCORE: 0

## 2022-03-08 NOTE — PATIENT INSTRUCTIONS
-Follow-up with Dr. Rosenthal for MRI results  -Schedule appointment in 1 year or sooner if needed  -Get labs 1-2 weeks prior to next appointment.   -It was a pleasure visiting with you today!

## 2022-03-09 PROBLEM — Z00.00 PREVENTATIVE HEALTH CARE: Status: RESOLVED | Noted: 2018-04-13 | Resolved: 2022-03-09

## 2022-03-09 PROBLEM — S89.91XA RIGHT KNEE INJURY: Status: ACTIVE | Noted: 2022-03-09

## 2022-03-09 PROBLEM — R52 PAIN RELATED TO VAGINAL DELIVERY: Status: RESOLVED | Noted: 2021-06-26 | Resolved: 2022-03-09

## 2022-03-09 NOTE — PROGRESS NOTES
New Patient    Sunitha Patton is a 27 y.o. female who presents today with the following:    CC: Establish Care with Primary Care Physician, Dr. Emi Salgado MD    HPI:  Patient is a 27-year-old female with no significant past medical history who presents to the clinic today to establish care.  She has no acute complaints today.  Patient sustained an injury to her right knee while skiing on 2022 at [facility].  States while she was skiing she twisted her right knee.  She was evaluated at [facility] with concern for ACL tear with evidence of Segond fracture.  Has her MRI scheduled for 3/9/2022 with follow-up with Dr. Rosenthal on 3/21/2022.  Taking ibuprofen 500 mg 1-2 times daily for pain.  States she will be set up with physical therapy at [facility].  Surgical history includes breast augmentation, which she got 6 weeks ago.  Family history significant for breast cancer in a maternal aunt at age 61.  Patient , both vaginal deliveries.  Up-to-date with pap smears. Currently has an IUD.  States her menstrual periods in the past occurred every 30 days and lasted about 5 to 7 days.  States she is up-to-date with her vaccinations.  Not vaccinated against COVID or influenza due to personal choice.  States she has already got Covid twice and has never got the flu.  Sees the dentist every year and gets yearly eye exams.  States she needs an optometry referral.  She is  and has 2 daughters.  States her diet is well and she typically gets exercise 2 times a week.  Does not use tobacco, alcohol or recreational drugs.    ROS:     Review of Systems   Constitutional: Negative for chills, diaphoresis, fever, malaise/fatigue and weight loss.   HENT: Negative for congestion, hearing loss and sore throat.    Eyes: Negative for blurred vision.   Respiratory: Negative for cough, sputum production, shortness of breath and wheezing.    Cardiovascular: Negative for chest pain, palpitations and claudication.   Gastrointestinal:  "Negative for abdominal pain, blood in stool, constipation, diarrhea, heartburn, melena, nausea and vomiting.   Genitourinary: Negative for dysuria, frequency, hematuria and urgency.   Musculoskeletal: Positive for joint pain.   Skin: Negative for itching and rash.   Neurological: Negative for dizziness, tingling, sensory change, focal weakness and headaches.   Endo/Heme/Allergies: Does not bruise/bleed easily.   Psychiatric/Behavioral: Negative for depression. The patient is not nervous/anxious and does not have insomnia.        Past Medical History:   Diagnosis Date   • Pain related to vaginal delivery 2021   • Preventative health care 2018   •  (spontaneous vaginal delivery) 2021       Past Surgical History:   Procedure Laterality Date   • MAMMOPLASTY AUGMENTATION         Family History   Problem Relation Age of Onset   • Breast Cancer Maternal Aunt        Social History     Tobacco Use   • Smoking status: Never Smoker   • Smokeless tobacco: Never Used   Vaping Use   • Vaping Use: Never used   Substance Use Topics   • Alcohol use: No     Comment: occasinally   • Drug use: No       Current Outpatient Medications   Medication Sig Dispense Refill   • docusate sodium 100 MG Cap Take 100 mg by mouth 2 times a day as needed for Constipation. (Patient not taking: Reported on 3/8/2022) 60 Cap 1     No current facility-administered medications for this visit.       Physical Exam:  /75 (BP Location: Left arm, Patient Position: Sitting, BP Cuff Size: Adult)   Pulse 60   Temp 36.8 °C (98.2 °F) (Temporal)   Ht 1.753 m (5' 9\")   Wt 68.9 kg (152 lb)   SpO2 98%   BMI 22.45 kg/m²   General: Well-developed, well-nourished female in no distress  HEENT: Conjuntiva and lids are within normal limits.  External auditory canals are within normal limits.  Tympanic membranes are clear with a normal light refflex.  Nasal mucosa is normal.  Oral pharynx is normal and free of exudate.  Neck: Supple, non-tender " with nothyromegaly noted.  Lympatic: Pre and Post auricular, sub-mandibular, anterior and posterior cervical and supraclavicular areas are without notable lymphadenopathy  Lungs: Clear to auscultation and perucssion bilaterally with good respiratory effort.  No wheezes, crackes or rhonci are heard.  Heart: Normal rate, regular rhythm with no murmurs, rubs or gallops heard.  Abdomen: Soft, non-tender, non-distended with normal-active bowel sounds.  Nor organomegaly noted.  Extremites: Right patella edema with tenderness. No pedal edema.  Psych: Patient is awake, alert and oriented with recent and remote memory intact. Mood and affect are normal.       Assessment and Plan:     1. Encounter to establish care with new doctor  No significant past medical history  Up-to-date with health screens.  Not vaccinated for Covid or influenza, personal choice.   States she needs referral for optometry.  -Ordered CBC and CMP  - Referral to Optometry  - Follow-up in 1 year or sooner if needed    2. Injury of right knee, sequela  Followed by JACKELIN  Injury to her right knee while skiing on 2/27/2022.  Concern for ACL tear with evidence of Segond fracture.    MRI scheduled for 3/9/2022 with follow-up with Dr. Rosenthal on 3/21/2022.   Taking ibuprofen 500 mg 1-2 times daily for pain.    Will be set-up with PT at [facility]  -Follow-up with Dr. Rosenthal       Return in about 1 year (around 3/8/2023).    Patient Instructions   -Follow-up with Dr. Rosenthal for MRI results  -Schedule appointment in 1 year or sooner if needed  -Get labs 1-2 weeks prior to next appointment.   -It was a pleasure visiting with you today!      Signed by: Emi Salgado M.D.      Emi Salgado MD PGY I  [facility]

## 2022-03-21 PROBLEM — S83.519A ACL (ANTERIOR CRUCIATE LIGAMENT) RUPTURE: Status: ACTIVE | Noted: 2022-03-21

## 2022-07-07 ENCOUNTER — OFFICE VISIT (OUTPATIENT)
Dept: URGENT CARE | Facility: CLINIC | Age: 27
End: 2022-07-07
Payer: COMMERCIAL

## 2022-07-07 VITALS
SYSTOLIC BLOOD PRESSURE: 98 MMHG | OXYGEN SATURATION: 96 % | HEIGHT: 67 IN | RESPIRATION RATE: 16 BRPM | DIASTOLIC BLOOD PRESSURE: 64 MMHG | WEIGHT: 147 LBS | TEMPERATURE: 97.9 F | HEART RATE: 60 BPM | BODY MASS INDEX: 23.07 KG/M2

## 2022-07-07 DIAGNOSIS — H60.331 ACUTE SWIMMER'S EAR OF RIGHT SIDE: ICD-10-CM

## 2022-07-07 DIAGNOSIS — J02.9 SORE THROAT: ICD-10-CM

## 2022-07-07 LAB
INT CON NEG: NORMAL
INT CON POS: NORMAL
S PYO AG THROAT QL: NEGATIVE

## 2022-07-07 PROCEDURE — 99203 OFFICE O/P NEW LOW 30 MIN: CPT | Performed by: FAMILY MEDICINE

## 2022-07-07 PROCEDURE — 87880 STREP A ASSAY W/OPTIC: CPT | Performed by: FAMILY MEDICINE

## 2022-07-07 RX ORDER — NEOMYCIN POLYMYXIN B SULFATES AND DEXAMETHASONE 3.5; 10000; 1 MG/ML; [USP'U]/ML; MG/ML
SUSPENSION/ DROPS OPHTHALMIC
Qty: 5 ML | Refills: 0 | Status: SHIPPED | OUTPATIENT
Start: 2022-07-07

## 2022-07-07 RX ORDER — AZITHROMYCIN 250 MG/1
TABLET, FILM COATED ORAL
Qty: 6 TABLET | Refills: 0 | Status: SHIPPED | OUTPATIENT
Start: 2022-07-07

## 2022-07-07 ASSESSMENT — ENCOUNTER SYMPTOMS: SORE THROAT: 1

## 2022-07-07 NOTE — PROGRESS NOTES
"Subjective     Sunitha Patton is a 27 y.o. female who presents with Pharyngitis (X last night, BA, sore throat, ear natalie)      - This is a pleasant and nontoxic appearing 27 y.o. female who has come to the walk-in clinic today for:    #1) ~1 day w/ sore throat. Also some Rt ear pain and headache. No cough/sinus or fever. Was swimming last week       ALLERGIES:  Patient has no known allergies.     PMH:  Past Medical History:   Diagnosis Date   • Pain related to vaginal delivery 2021   • Preventative health care 2018   •  (spontaneous vaginal delivery) 2021        PSH:  Past Surgical History:   Procedure Laterality Date   • PB KNEE SCOPE,AID ANT CRUCIATE REPAIR Right 2022    Procedure: RIGHT KNEE ARTHROSCOPY ANTERIOR CRUCIATE LIGAMENT RECONSTRUCTION WITH HAMSTRING AUTOGRAFT, REPAIRS AS INDICATED;  Surgeon: John Rosenthal M.D.;  Location: [facility];  Service: Orthopedics   • MAMMOPLASTY AUGMENTATION         MEDS:    Current Outpatient Medications:   •  neomycin-polymyxin-dexamethasone (MAXITROL) 0.1 % ophthalmic suspension, 4 drops Rt ear TID x 5 days, Disp: 5 mL, Rfl: 0  •  azithromycin (ZITHROMAX) 250 MG Tab, Use as directed, Disp: 6 Tablet, Rfl: 0    ** I have documented what I find to be significant in regards to past medical, social, family and surgical history  in my HPI or under PMH/PSH/FH review section, otherwise it is noncontributory **           HPI    Review of Systems   HENT: Positive for sore throat.    All other systems reviewed and are negative.             Objective     BP (!) 98/64 (BP Location: Left arm, Patient Position: Sitting, BP Cuff Size: Adult long)   Pulse 60   Temp 36.6 °C (97.9 °F) (Temporal)   Resp 16   Ht 1.702 m (5' 7\")   Wt 66.7 kg (147 lb)   SpO2 96%   BMI 23.02 kg/m²      Physical Exam  Vitals and nursing note reviewed.   Constitutional:       General: She is not in acute distress.     Appearance: Normal appearance. She is " well-developed.   HENT:      Head: Normocephalic.      Right Ear: Tympanic membrane and external ear normal. There is no impacted cerumen.      Left Ear: Tympanic membrane and external ear normal.      Ears:      Comments: Rt ear: TM w/ some erythema and some mild pain during otoscope exam      Mouth/Throat:      Mouth: Mucous membranes are moist.      Pharynx: Oropharyngeal exudate and posterior oropharyngeal erythema present.   Cardiovascular:      Heart sounds: Normal heart sounds. No murmur heard.  Pulmonary:      Effort: Pulmonary effort is normal. No respiratory distress.      Breath sounds: Normal breath sounds.   Musculoskeletal:      Cervical back: No tenderness.   Lymphadenopathy:      Cervical: No cervical adenopathy.   Neurological:      Mental Status: She is alert.      Motor: No abnormal muscle tone.   Psychiatric:         Mood and Affect: Mood normal.         Behavior: Behavior normal.         Assessment & Plan       1. Sore throat  POCT Rapid Strep A    azithromycin (ZITHROMAX) 250 MG Tab   2. Acute swimmer's ear of right side  neomycin-polymyxin-dexamethasone (MAXITROL) 0.1 % ophthalmic suspension     * viral pharyngitis, Patient given contingency Rx for abx. Discussed when to fill. Also Rt swimmer ears, will treat as above       - Dx, plan & d/c instructions discussed   - Rest, stay hydrated, OTC Motrin and/or Tylenol as needed  - E.R. precautions discussed     Asked to kindly follow up with their PCP's office for a recheck on today's visit, ER if not improving in 2-3 days or if feeling/getting worse. If you do not have a primary care doctor and need to schedule one you may call [facility] at [phone] to do this.    Any realistic side effects of medications that may have been given today reviewed.     Patient left in stable condition     POCT results reviewed/discussed

## 2023-06-10 ENCOUNTER — OFFICE VISIT (OUTPATIENT)
Dept: URGENT CARE | Facility: CLINIC | Age: 28
End: 2023-06-10
Payer: COMMERCIAL

## 2023-06-10 VITALS
OXYGEN SATURATION: 98 % | SYSTOLIC BLOOD PRESSURE: 122 MMHG | DIASTOLIC BLOOD PRESSURE: 74 MMHG | HEIGHT: 67 IN | WEIGHT: 152 LBS | TEMPERATURE: 99 F | RESPIRATION RATE: 18 BRPM | BODY MASS INDEX: 23.86 KG/M2 | HEART RATE: 90 BPM

## 2023-06-10 DIAGNOSIS — J02.0 PHARYNGITIS DUE TO STREPTOCOCCUS SPECIES: ICD-10-CM

## 2023-06-10 LAB
INT CON NEG: NORMAL
INT CON POS: NORMAL
S PYO AG THROAT QL: POSITIVE

## 2023-06-10 PROCEDURE — 99213 OFFICE O/P EST LOW 20 MIN: CPT

## 2023-06-10 PROCEDURE — 87880 STREP A ASSAY W/OPTIC: CPT

## 2023-06-10 PROCEDURE — 3074F SYST BP LT 130 MM HG: CPT

## 2023-06-10 PROCEDURE — 3078F DIAST BP <80 MM HG: CPT

## 2023-06-10 RX ORDER — DEXAMETHASONE SODIUM PHOSPHATE 10 MG/ML
10 INJECTION INTRAMUSCULAR; INTRAVENOUS ONCE
Status: COMPLETED | OUTPATIENT
Start: 2023-06-10 | End: 2023-06-10

## 2023-06-10 RX ORDER — METHYLPREDNISOLONE 4 MG/1
TABLET ORAL
Qty: 21 TABLET | Refills: 0 | Status: SHIPPED | OUTPATIENT
Start: 2023-06-10

## 2023-06-10 RX ORDER — LIDOCAINE HYDROCHLORIDE 20 MG/ML
SOLUTION OROPHARYNGEAL
Qty: 100 ML | Refills: 0 | Status: SHIPPED | OUTPATIENT
Start: 2023-06-10

## 2023-06-10 RX ORDER — AMOXICILLIN AND CLAVULANATE POTASSIUM 875; 125 MG/1; MG/1
1 TABLET, FILM COATED ORAL 2 TIMES DAILY
Qty: 20 TABLET | Refills: 0 | Status: SHIPPED | OUTPATIENT
Start: 2023-06-10 | End: 2023-06-20

## 2023-06-10 RX ADMIN — DEXAMETHASONE SODIUM PHOSPHATE 10 MG: 10 INJECTION INTRAMUSCULAR; INTRAVENOUS at 15:50

## 2023-06-10 ASSESSMENT — ENCOUNTER SYMPTOMS
HEMOPTYSIS: 0
ABDOMINAL PAIN: 0
NECK PAIN: 0
STRIDOR: 0
HOARSE VOICE: 1
SORE THROAT: 1
MYALGIAS: 1
CHILLS: 1
TROUBLE SWALLOWING: 0
HEADACHES: 1
SWOLLEN GLANDS: 0
COUGH: 0
VOMITING: 0
DIAPHORESIS: 0
NAUSEA: 0
WEIGHT LOSS: 0
SHORTNESS OF BREATH: 0
WHEEZING: 0
SINUS PAIN: 0
DIARRHEA: 0
SPUTUM PRODUCTION: 0
FEVER: 1

## 2023-06-10 NOTE — PROGRESS NOTES
Subjective:     Sunitha Patton is a 28 y.o. female who presents for Pharyngitis (X 1 days, sore throat, chills, headache, body aches, nausea)      Pharyngitis   This is a new problem. The current episode started yesterday. The problem has been gradually worsening. The pain is worse on the right side. Maximum temperature: felt feverish. Associated symptoms include congestion, headaches, a hoarse voice and a plugged ear sensation. Pertinent negatives include no abdominal pain, coughing, diarrhea, drooling, ear discharge, ear pain, neck pain, shortness of breath, stridor, swollen glands, trouble swallowing or vomiting. Associated symptoms comments: Nausea . She has had exposure to strep. She has had no exposure to mono. She has tried NSAIDs (DayQuil and Nyquil, 500 mg) for the symptoms. The treatment provided mild relief.     Reports that she has a PMH of strep pharyngitis and it feels the same.     Review of Systems   Constitutional:  Positive for chills, fever and malaise/fatigue. Negative for diaphoresis and weight loss.   HENT:  Positive for congestion, hoarse voice and sore throat. Negative for drooling, ear discharge, ear pain, sinus pain and trouble swallowing.    Respiratory:  Negative for cough, hemoptysis, sputum production, shortness of breath, wheezing and stridor.    Cardiovascular:  Negative for chest pain.   Gastrointestinal:  Negative for abdominal pain, diarrhea, nausea and vomiting.   Musculoskeletal:  Positive for myalgias. Negative for neck pain.   Neurological:  Positive for headaches.   All other systems reviewed and are negative.      PMH:   Past Medical History:   Diagnosis Date    Pain related to vaginal delivery 2021    Preventative health care 2018     (spontaneous vaginal delivery) 2021     ALLERGIES: No Known Allergies  SURGHX:   Past Surgical History:   Procedure Laterality Date    PB KNEE SCOPE,AID ANT CRUCIATE REPAIR Right 2022    Procedure: RIGHT KNEE  "ARTHROSCOPY ANTERIOR CRUCIATE LIGAMENT RECONSTRUCTION WITH HAMSTRING AUTOGRAFT, REPAIRS AS INDICATED;  Surgeon: John Rosenthal M.D.;  Location: [facility];  Service: Orthopedics    MAMMOPLASTY AUGMENTATION       SOCHX:   Social History     Socioeconomic History    Marital status:    Tobacco Use    Smoking status: Never    Smokeless tobacco: Never   Vaping Use    Vaping Use: Never used   Substance and Sexual Activity    Alcohol use: No     Comment: occasinally    Drug use: No    Sexual activity: Yes     Partners: Male   Social History Narrative    ** Merged History Encounter **          FH:   Family History   Problem Relation Age of Onset    Breast Cancer Maternal Aunt          Objective:   /74   Pulse (!) 107   Temp 37.2 °C (99 °F) (Temporal)   Resp 18   Ht 1.702 m (5' 7\")   Wt 68.9 kg (152 lb)   SpO2 (!) 84%   BMI 23.81 kg/m²     Physical Exam  Vitals reviewed.   Constitutional:       General: She is not in acute distress.     Appearance: Normal appearance. She is normal weight. She is ill-appearing. She is not toxic-appearing or diaphoretic.   HENT:      Head: Normocephalic and atraumatic.      Right Ear: Tympanic membrane, ear canal and external ear normal.      Left Ear: Tympanic membrane, ear canal and external ear normal.      Nose: Congestion present.      Right Sinus: No maxillary sinus tenderness or frontal sinus tenderness.      Left Sinus: No maxillary sinus tenderness.      Mouth/Throat:      Lips: Pink.      Mouth: Mucous membranes are moist.      Dentition: Normal dentition. Does not have dentures. No dental tenderness.      Tongue: No lesions. Tongue does not deviate from midline.      Palate: No mass and lesions.      Pharynx: Uvula midline. Oropharyngeal exudate and posterior oropharyngeal erythema present. No pharyngeal swelling or uvula swelling.      Tonsils: Tonsillar exudate present. No tonsillar abscesses. 1+ on the right. 1+ on the left.   Eyes:      " Extraocular Movements: Extraocular movements intact.      Conjunctiva/sclera: Conjunctivae normal.      Pupils: Pupils are equal, round, and reactive to light.   Neck:      Vascular: No carotid bruit.   Cardiovascular:      Rate and Rhythm: Normal rate and regular rhythm.      Pulses: Normal pulses.      Heart sounds: Normal heart sounds.   Pulmonary:      Effort: Pulmonary effort is normal.      Breath sounds: Normal breath sounds.   Abdominal:      General: Abdomen is flat.      Palpations: Abdomen is soft.   Musculoskeletal:         General: Normal range of motion.      Cervical back: Normal range of motion and neck supple. No rigidity or tenderness.   Lymphadenopathy:      Cervical: Cervical adenopathy present.   Skin:     General: Skin is warm and dry.   Neurological:      Mental Status: She is alert.   Psychiatric:         Mood and Affect: Mood normal.         Behavior: Behavior normal.         Thought Content: Thought content normal.       Results for orders placed or performed in visit on 06/10/23   POCT Rapid Strep A   Result Value Ref Range    Rapid Strep Screen Positive     Internal Control Positive Valid     Internal Control Negative Valid       Assessment/Plan:   Assessment      1. Pharyngitis due to Streptococcus species  - POCT Rapid Strep A  - amoxicillin-clavulanate (AUGMENTIN) 875-125 MG Tab; Take 1 Tablet by mouth 2 times a day for 10 days.  Dispense: 20 Tablet; Refill: 0  - dexamethasone (DECADRON) injection (check route below) 10 mg  - methylPREDNISolone (MEDROL DOSEPAK) 4 MG Tablet Therapy Pack; Follow schedule on package instructions.  Dispense: 21 Tablet; Refill: 0  - lidocaine (XYLOCAINE) 2 % Solution; 5mL orally, may be applied directly to surface of ulcers or used as a swish and spit up to three times daily as needed for oral ulcers  Dispense: 100 mL; Refill: 0     POCT group A strep positive in clinic.  Prescription for Augmentin sent to patient's preferred pharmacy.   Per the [place] trial  I will administer 10 mg of dexamethasone in clinic for pharyngitis.  I discussed the risks vs benefits as well as alternatives with the patient and using shared decision making we have elected this as a treatment modality.   Medrol Dosepak sent to pharmacy in the event that patient develops severe pharyngitis 72 hours after oral dexamethasone was given in the clinic.  Lidocaine swish and spit as needed.  Supportive care for strep pharyngitis discussed with patient and .  Strict ER precautions in place in the event that patient develops significant tonsillar swelling leading to compromised airway, difficulty swallowing. All questions answered. Patient verbalized understanding and is in agreement with this plan of care.     If symptoms are worsening or not improving in 3-5 days, follow-up with PCP or return to UC. Differential diagnosis, natural history, and supportive care discussed. AVS handout given and reviewed with patient. Patient educated on red flags and when to seek treatment back in ED or UC.     I personally reviewed prior external notes and test results pertinent to today's visit.  I have independently reviewed and interpreted all diagnostics ordered during this urgent care visit.     This dictation has been created using voice recognition software. The accuracy of the dictation is limited by the abilities of the software. I expect there may be some errors of grammar and possibly content. I made every attempt to manually correct the errors within my dictation. However, errors related to voice recognition software may still exist and should be interpreted within the appropriate context.    This note was electronically signed by ARLETTE Scales

## 2023-08-28 ENCOUNTER — TELEPHONE (OUTPATIENT)
Dept: MEDICAL GROUP | Facility: LAB | Age: 28
End: 2023-08-28

## 2023-08-28 NOTE — TELEPHONE ENCOUNTER
"1. Name: Sunitha alberts  Call Back Number: [phone]      How would the patient prefer to be contacted with a response: Phone call OK to leave a detailed message   Patient is requesting a call back to request a npt appt with mary . She called med group scheduling and told them that she is good friend of bereket, and that he would take her on as a new patient. I told her I would reach out to the ma and dr faye to approve and someone would reach out to her .     \"You can request a change to your medical record documentation through our website by visiting [email or web address]. Or, you can call [phone] and someone from our Release of Information department will call you back within 24-48 hours, Monday through Friday.\"    "

## 2023-09-12 ENCOUNTER — OFFICE VISIT (OUTPATIENT)
Dept: INTERNAL MEDICINE | Facility: OTHER | Age: 28
End: 2023-09-12
Payer: COMMERCIAL

## 2023-09-12 VITALS
HEART RATE: 66 BPM | OXYGEN SATURATION: 99 % | HEIGHT: 67 IN | SYSTOLIC BLOOD PRESSURE: 99 MMHG | WEIGHT: 154 LBS | TEMPERATURE: 97.5 F | BODY MASS INDEX: 24.17 KG/M2 | DIASTOLIC BLOOD PRESSURE: 60 MMHG

## 2023-09-12 DIAGNOSIS — N94.3 PMS (PREMENSTRUAL SYNDROME): ICD-10-CM

## 2023-09-12 DIAGNOSIS — R53.83 FATIGUE, UNSPECIFIED TYPE: ICD-10-CM

## 2023-09-12 PROCEDURE — 3074F SYST BP LT 130 MM HG: CPT

## 2023-09-12 PROCEDURE — 99214 OFFICE O/P EST MOD 30 MIN: CPT | Mod: GC

## 2023-09-12 PROCEDURE — 3078F DIAST BP <80 MM HG: CPT

## 2023-09-12 ASSESSMENT — PATIENT HEALTH QUESTIONNAIRE - PHQ9: CLINICAL INTERPRETATION OF PHQ2 SCORE: 0

## 2023-09-13 NOTE — PROGRESS NOTES
Established Patient    Patient Care Team:  Emi Salgado M.D. as PCP - General (Internal Medicine)  Jaswant Chen D.O. (Family Medicine)    Sunitha Patton is a 28 y.o. female who presents today with the following Chief Complaint(s): Follow up for Diagnoses of Fatigue, unspecified type and PMS (premenstrual syndrome) were pertinent to this visit.    HPI:  Sunitha Patton is a 28 year-old female who presents to the clinic today due to complaints of fatigue and mood swings around her menstrual periods.     Describes her fatigue as feeling sleepy. Gets around 7-9 hours of sleep. Wakes up 2-3 times at night but able to fall back asleep easily. No depression. No snoring. No recent illness. Does state life is stressful with work and taking care of her 2 daughters.     Also, concerned about mood swings around her periods. Gets her periods every month. No heavy bleeding. Not on any birth control. Eats a well balanced diet and exercises 4 times a week. Concerned about hormone levels.     ROS:     General: No fevers, chills, night sweats, weight loss or gain  HEENT: No hearing changes, vision changes, eye pain, ear pain, nasal discharge, sore throat  Neck: No swelling in neck  Pulmonary: No shortness of breath, cough, sputum, or hemoptysis  Cardiovascular: No chest pain, palpitations, or LE swelling  GI: No nausea, vomiting, diarrhea, constipation, abdominal pain, hematochezia or melena  : No dysuria or frequency  Neuro: No focal weakness, no general weakness, no headaches, no lightheadedness, no dizziness  Psych: No anxiety or depression    Past Medical History:   Diagnosis Date    Pain related to vaginal delivery 2021    Preventative health care 2018     (spontaneous vaginal delivery) 2021     Social History     Tobacco Use    Smoking status: Never    Smokeless tobacco: Never   Vaping Use    Vaping Use: Never used   Substance Use Topics    Alcohol use: No     Comment: occasinally    Drug use:  "No     Current Outpatient Medications   Medication Sig Dispense Refill    methylPREDNISolone (MEDROL DOSEPAK) 4 MG Tablet Therapy Pack Follow schedule on package instructions. (Patient not taking: Reported on 9/12/2023) 21 Tablet 0    lidocaine (XYLOCAINE) 2 % Solution 5mL orally, may be applied directly to surface of ulcers or used as a swish and spit up to three times daily as needed for oral ulcers (Patient not taking: Reported on 9/12/2023) 100 mL 0    neomycin-polymyxin-dexamethasone (MAXITROL) 0.1 % ophthalmic suspension 4 drops Rt ear TID x 5 days (Patient not taking: Reported on 6/10/2023) 5 mL 0    azithromycin (ZITHROMAX) 250 MG Tab Use as directed (Patient not taking: Reported on 6/10/2023) 6 Tablet 0     No current facility-administered medications for this visit.       Physical Exam:  BP 99/60 (BP Location: Right arm, Patient Position: Sitting, BP Cuff Size: Adult)   Pulse 66   Temp 36.4 °C (97.5 °F) (Temporal)   Ht 1.702 m (5' 7\")   Wt 69.9 kg (154 lb)   SpO2 99%   BMI 24.12 kg/m²   General: Well developed, well nourished female, in no distress.  HEENT: NC/AT, PERRL, EOMI, no scleral icterus or conjunctival pallor  Neck: Supple, No cervical or supraclavicular LAD  CV:RRR, no murmurs gallops or Rubs, no JVD  Pulm: LCAB, no crackles, rales, rhonchi, or wheezing  GI: Normal bowel sounds, abdomen soft, nontender, nondistended to deep or light palpation in all 4 quadrants, no HSM.  MSK: Radial pulses 2+ and equal bilaterally.  Strength 5 out of 5 in upper and lower extremities.  No lower extremity edema  Neuro: Patient is alert and oriented x3, no focal deficits  Psych: Appropriate mood and affect       Assessment and Plan:   1. Fatigue, unspecified type  Fatigue possibly in the setting of stress from working and having to take care of 2 children  Ordered the following labs to rule out organic causes of fatigue. Handout provided.   - CBC WITH DIFFERENTIAL; Future  - Comp Metabolic Panel; Future  - " TSH+FREE T4  - VITAMIN B12; Future  - IRON/TOTAL IRON BIND; Future  - FERRITIN; Future  - TRANSFERRIN SATURATION  - MAGNESIUM; Future  - VITAMIN D,25 HYDROXY (DEFICIENCY); Future  Follow-up in 6 weeks.       2. PMS (premenstrual syndrome)  Mood swings around periods  Discussed with patient options for treatment include birth control as well as SSRIs. Patient deferred. Can revisit next appointment if symptoms worsening.   Also, concerning testing of her hormone levels, discussed that levels change throughout menstrual cycle.   Discussed well balanced diet,  continuing exercise, sleep and minimizing stress.   Follow-up in 6 weeks.     Return in about 6 weeks (around 10/24/2023).    Patient Instructions   -Will check labs. Get labs prior to next appointment.  -It was nice visiting with you!      Emi Salgado M.D. PGY-3  [facility]    This note was created using voice recognition software.  While every attempt is made to ensure accuracy of transcription, occasionally errors occur.

## 2023-11-14 ENCOUNTER — HOSPITAL ENCOUNTER (OUTPATIENT)
Dept: LAB | Facility: MEDICAL CENTER | Age: 28
End: 2023-11-14
Payer: COMMERCIAL

## 2023-11-14 DIAGNOSIS — R53.83 FATIGUE, UNSPECIFIED TYPE: ICD-10-CM

## 2023-11-14 LAB
25(OH)D3 SERPL-MCNC: 36 NG/ML (ref 30–100)
ALBUMIN SERPL BCP-MCNC: 4.5 G/DL (ref 3.2–4.9)
ALBUMIN/GLOB SERPL: 1.5 G/DL
ALP SERPL-CCNC: 54 U/L (ref 30–99)
ALT SERPL-CCNC: 13 U/L (ref 2–50)
ANION GAP SERPL CALC-SCNC: 9 MMOL/L (ref 7–16)
AST SERPL-CCNC: 20 U/L (ref 12–45)
BASOPHILS # BLD AUTO: 0.3 % (ref 0–1.8)
BASOPHILS # BLD: 0.02 K/UL (ref 0–0.12)
BILIRUB SERPL-MCNC: 0.5 MG/DL (ref 0.1–1.5)
BUN SERPL-MCNC: 11 MG/DL (ref 8–22)
CALCIUM ALBUM COR SERPL-MCNC: 8.9 MG/DL (ref 8.5–10.5)
CALCIUM SERPL-MCNC: 9.3 MG/DL (ref 8.5–10.5)
CHLORIDE SERPL-SCNC: 102 MMOL/L (ref 96–112)
CO2 SERPL-SCNC: 26 MMOL/L (ref 20–33)
CREAT SERPL-MCNC: 0.73 MG/DL (ref 0.5–1.4)
EOSINOPHIL # BLD AUTO: 0.09 K/UL (ref 0–0.51)
EOSINOPHIL NFR BLD: 1.2 % (ref 0–6.9)
ERYTHROCYTE [DISTWIDTH] IN BLOOD BY AUTOMATED COUNT: 41.5 FL (ref 35.9–50)
FERRITIN SERPL-MCNC: 45 NG/ML (ref 10–291)
GFR SERPLBLD CREATININE-BSD FMLA CKD-EPI: 115 ML/MIN/1.73 M 2
GLOBULIN SER CALC-MCNC: 3 G/DL (ref 1.9–3.5)
GLUCOSE SERPL-MCNC: 86 MG/DL (ref 65–99)
HCT VFR BLD AUTO: 41.5 % (ref 37–47)
HGB BLD-MCNC: 13.5 G/DL (ref 12–16)
IMM GRANULOCYTES # BLD AUTO: 0.02 K/UL (ref 0–0.11)
IMM GRANULOCYTES NFR BLD AUTO: 0.3 % (ref 0–0.9)
IRON SATN MFR SERPL: 29 % (ref 15–55)
IRON SERPL-MCNC: 81 UG/DL (ref 40–170)
LYMPHOCYTES # BLD AUTO: 2.12 K/UL (ref 1–4.8)
LYMPHOCYTES NFR BLD: 29.3 % (ref 22–41)
MAGNESIUM SERPL-MCNC: 2.2 MG/DL (ref 1.5–2.5)
MCH RBC QN AUTO: 30.3 PG (ref 27–33)
MCHC RBC AUTO-ENTMCNC: 32.5 G/DL (ref 32.2–35.5)
MCV RBC AUTO: 93.3 FL (ref 81.4–97.8)
MONOCYTES # BLD AUTO: 0.36 K/UL (ref 0–0.85)
MONOCYTES NFR BLD AUTO: 5 % (ref 0–13.4)
NEUTROPHILS # BLD AUTO: 4.63 K/UL (ref 1.82–7.42)
NEUTROPHILS NFR BLD: 63.9 % (ref 44–72)
NRBC # BLD AUTO: 0 K/UL
NRBC BLD-RTO: 0 /100 WBC (ref 0–0.2)
PLATELET # BLD AUTO: 303 K/UL (ref 164–446)
PMV BLD AUTO: 10.6 FL (ref 9–12.9)
POTASSIUM SERPL-SCNC: 3.8 MMOL/L (ref 3.6–5.5)
PROT SERPL-MCNC: 7.5 G/DL (ref 6–8.2)
RBC # BLD AUTO: 4.45 M/UL (ref 4.2–5.4)
SODIUM SERPL-SCNC: 137 MMOL/L (ref 135–145)
T4 FREE SERPL-MCNC: 0.94 NG/DL (ref 0.93–1.7)
TIBC SERPL-MCNC: 275 UG/DL (ref 250–450)
TRANSFERRIN SERPL-MCNC: 254 MG/DL (ref 200–370)
TSH SERPL DL<=0.005 MIU/L-ACNC: 2.99 UIU/ML (ref 0.38–5.33)
UIBC SERPL-MCNC: 194 UG/DL (ref 110–370)
VIT B12 SERPL-MCNC: 470 PG/ML (ref 211–911)
WBC # BLD AUTO: 7.2 K/UL (ref 4.8–10.8)

## 2023-11-14 PROCEDURE — 36415 COLL VENOUS BLD VENIPUNCTURE: CPT

## 2023-11-14 PROCEDURE — 85025 COMPLETE CBC W/AUTO DIFF WBC: CPT

## 2023-11-14 PROCEDURE — 83735 ASSAY OF MAGNESIUM: CPT

## 2023-11-14 PROCEDURE — 82728 ASSAY OF FERRITIN: CPT

## 2023-11-14 PROCEDURE — 83540 ASSAY OF IRON: CPT

## 2023-11-14 PROCEDURE — 82306 VITAMIN D 25 HYDROXY: CPT

## 2023-11-14 PROCEDURE — 84439 ASSAY OF FREE THYROXINE: CPT

## 2023-11-14 PROCEDURE — 80053 COMPREHEN METABOLIC PANEL: CPT

## 2023-11-14 PROCEDURE — 84443 ASSAY THYROID STIM HORMONE: CPT

## 2023-11-14 PROCEDURE — 82607 VITAMIN B-12: CPT

## 2023-11-14 PROCEDURE — 84466 ASSAY OF TRANSFERRIN: CPT

## 2023-11-14 PROCEDURE — 83550 IRON BINDING TEST: CPT
